# Patient Record
Sex: FEMALE | Race: OTHER | NOT HISPANIC OR LATINO
[De-identification: names, ages, dates, MRNs, and addresses within clinical notes are randomized per-mention and may not be internally consistent; named-entity substitution may affect disease eponyms.]

---

## 2017-04-03 ENCOUNTER — APPOINTMENT (OUTPATIENT)
Dept: PLASTIC SURGERY | Facility: CLINIC | Age: 32
End: 2017-04-03

## 2017-04-05 ENCOUNTER — APPOINTMENT (OUTPATIENT)
Dept: PLASTIC SURGERY | Facility: CLINIC | Age: 32
End: 2017-04-05

## 2017-04-12 ENCOUNTER — APPOINTMENT (OUTPATIENT)
Dept: PLASTIC SURGERY | Facility: CLINIC | Age: 32
End: 2017-04-12

## 2017-04-12 VITALS — BODY MASS INDEX: 30.36 KG/M2 | WEIGHT: 165 LBS | HEIGHT: 62 IN

## 2017-04-12 DIAGNOSIS — N62 HYPERTROPHY OF BREAST: ICD-10-CM

## 2017-06-15 ENCOUNTER — RESULT REVIEW (OUTPATIENT)
Age: 32
End: 2017-06-15

## 2018-08-03 ENCOUNTER — OFFICE VISIT (OUTPATIENT)
Dept: INTERNAL MEDICINE CLINIC | Facility: CLINIC | Age: 33
End: 2018-08-03
Payer: COMMERCIAL

## 2018-08-03 VITALS
SYSTOLIC BLOOD PRESSURE: 120 MMHG | DIASTOLIC BLOOD PRESSURE: 80 MMHG | BODY MASS INDEX: 30.56 KG/M2 | TEMPERATURE: 98 F | WEIGHT: 179 LBS | HEIGHT: 64 IN | HEART RATE: 71 BPM

## 2018-08-03 DIAGNOSIS — G89.29 CHRONIC BILATERAL LOW BACK PAIN WITHOUT SCIATICA: ICD-10-CM

## 2018-08-03 DIAGNOSIS — M26.629 TMJ SYNDROME: ICD-10-CM

## 2018-08-03 DIAGNOSIS — Z00.00 ANNUAL PHYSICAL EXAM: Primary | ICD-10-CM

## 2018-08-03 DIAGNOSIS — M25.50 ARTHRALGIA, UNSPECIFIED JOINT: ICD-10-CM

## 2018-08-03 DIAGNOSIS — M54.50 CHRONIC BILATERAL LOW BACK PAIN WITHOUT SCIATICA: ICD-10-CM

## 2018-08-03 PROCEDURE — 99395 PREV VISIT EST AGE 18-39: CPT | Performed by: INTERNAL MEDICINE

## 2018-08-03 NOTE — PROGRESS NOTES
HPI:    Patient ID: Indio Augustine is a 28year old female. Patient presents today for her annual physical       Arthralgia   This is a chronic problem. The current episode started more than 1 month ago. The problem occurs intermittently.  The proble exhibits no discharge. No scleral icterus. Neck: Normal range of motion. Neck supple. No thyromegaly present. Cardiovascular: Normal rate, regular rhythm and intact distal pulses. Exam reveals no gallop and no friction rub. No murmur heard.   Pulmon requested or ordered in this encounter    Imaging & Referrals:  None       [id number]

## 2018-08-04 PROBLEM — G89.29 CHRONIC BILATERAL LOW BACK PAIN WITHOUT SCIATICA: Status: ACTIVE | Noted: 2018-08-04

## 2018-08-04 PROBLEM — M26.629 TMJ SYNDROME: Status: ACTIVE | Noted: 2018-08-04

## 2018-08-04 PROBLEM — M25.50 ARTHRALGIA: Status: ACTIVE | Noted: 2018-08-04

## 2018-08-04 PROBLEM — Z00.00 ANNUAL PHYSICAL EXAM: Status: ACTIVE | Noted: 2018-08-04

## 2018-08-04 PROBLEM — M54.50 CHRONIC BILATERAL LOW BACK PAIN WITHOUT SCIATICA: Status: ACTIVE | Noted: 2018-08-04

## 2019-01-24 ENCOUNTER — APPOINTMENT (OUTPATIENT)
Dept: HUMAN REPRODUCTION | Facility: CLINIC | Age: 34
End: 2019-01-24
Payer: COMMERCIAL

## 2019-01-24 PROCEDURE — 99204 OFFICE O/P NEW MOD 45 MIN: CPT

## 2019-01-24 PROCEDURE — 76830 TRANSVAGINAL US NON-OB: CPT

## 2019-01-24 PROCEDURE — 36415 COLL VENOUS BLD VENIPUNCTURE: CPT

## 2019-01-24 RX ORDER — DOXYCYCLINE HYCLATE 100 MG/1
100 CAPSULE ORAL
Qty: 5 | Refills: 0 | Status: ACTIVE | COMMUNITY
Start: 2019-01-24 | End: 1900-01-01

## 2019-01-29 ENCOUNTER — OTHER (OUTPATIENT)
Age: 34
End: 2019-01-29

## 2019-01-29 DIAGNOSIS — N97.9 FEMALE INFERTILITY, UNSPECIFIED: ICD-10-CM

## 2019-01-30 RX ORDER — VITAMIN A ACETATE, ASCORBIC ACID, CHOLECALCIFEROL, ALPHA-TOCOPHEROL ACETATE, DL, THIAMINE MONONITRATE, RIBOFLAVIN, NIACINAMIDE, PYRIDOXINE HYDROCHLORIDE, FOLIC ACID, CYANOCOBALAMIN, BIOTIN, CALCIUM PANTOTHENATE, CALCIUM CARBONATE, FERROUS FUMARATE, POTASSIUM IODIDE, MAGNESIUM OXIDE, ZINC OXIDE AND CUPRIC OXIDE 2500; 80; 400; 10; 3; 3.4; 20; 20; 1; 12; 300; 6; 120; 27; 150; 30; 15; 2 [IU]/1; MG/1; [IU]/1; [IU]/1; MG/1; MG/1; MG/1; MG/1; MG/1; UG/1; UG/1; MG/1; MG/1; MG/1; UG/1; UG/1; UG/1; MG/1
27-0.6-0.4 TABLET, FILM COATED ORAL DAILY
Qty: 30 | Refills: 6 | Status: ACTIVE | COMMUNITY
Start: 2019-01-29 | End: 1900-01-01

## 2019-01-31 ENCOUNTER — APPOINTMENT (OUTPATIENT)
Dept: HUMAN REPRODUCTION | Facility: CLINIC | Age: 34
End: 2019-01-31
Payer: COMMERCIAL

## 2019-01-31 PROCEDURE — 36415 COLL VENOUS BLD VENIPUNCTURE: CPT

## 2019-02-04 ENCOUNTER — APPOINTMENT (OUTPATIENT)
Dept: RADIOLOGY | Facility: HOSPITAL | Age: 34
End: 2019-02-04
Payer: COMMERCIAL

## 2019-02-04 ENCOUNTER — OUTPATIENT (OUTPATIENT)
Dept: OUTPATIENT SERVICES | Facility: HOSPITAL | Age: 34
LOS: 1 days | End: 2019-02-04
Payer: MEDICAID

## 2019-02-04 DIAGNOSIS — N97.1 FEMALE INFERTILITY OF TUBAL ORIGIN: ICD-10-CM

## 2019-02-04 DIAGNOSIS — Z00.00 ENCOUNTER FOR GENERAL ADULT MEDICAL EXAMINATION WITHOUT ABNORMAL FINDINGS: ICD-10-CM

## 2019-02-04 PROCEDURE — 84702 CHORIONIC GONADOTROPIN TEST: CPT

## 2019-02-04 PROCEDURE — 58340 CATHETER FOR HYSTEROGRAPHY: CPT

## 2019-02-04 PROCEDURE — 74740 X-RAY FEMALE GENITAL TRACT: CPT

## 2019-02-04 PROCEDURE — 74740 X-RAY FEMALE GENITAL TRACT: CPT | Mod: 26

## 2019-02-07 ENCOUNTER — APPOINTMENT (OUTPATIENT)
Dept: HUMAN REPRODUCTION | Facility: CLINIC | Age: 34
End: 2019-02-07

## 2019-02-12 RX ORDER — NORETHINDRONE AND ETHINYL ESTRADIOL 1 MG-35MCG
1-35 KIT ORAL
Qty: 1 | Refills: 0 | Status: ACTIVE | COMMUNITY
Start: 2019-02-12 | End: 1900-01-01

## 2019-02-14 RX ORDER — VITAMIN C, CALCIUM, IRON, VITAMIN D3, VITAMIN E, THIAMIN, RIBOFLAVIN, NIACINAMIDE, VITAMIN B6, FOLIC ACID, IODINE, ZINC, COPPER, DOCUSATE SODIUM 120; 85; 30; 3; 20; 20; 1; 25; 2; 50; 159; 4.54; 150; 5; 400; 3.4 MG/1; MG/1; [IU]/1; MG/1; MG/1; MG/1; MG/1; MG/1; MG/1; MG/1; MG/1; MG/1; UG/1; MG/1; [IU]/1; MG/1
90-1 & 300 TABLET ORAL
Qty: 60 | Refills: 4 | Status: ACTIVE | COMMUNITY
Start: 2019-02-14 | End: 1900-01-01

## 2019-02-27 ENCOUNTER — APPOINTMENT (OUTPATIENT)
Dept: HUMAN REPRODUCTION | Facility: CLINIC | Age: 34
End: 2019-02-27
Payer: COMMERCIAL

## 2019-02-27 PROCEDURE — 99214 OFFICE O/P EST MOD 30 MIN: CPT

## 2020-04-09 ENCOUNTER — APPOINTMENT (OUTPATIENT)
Dept: DISASTER EMERGENCY | Facility: CLINIC | Age: 35
End: 2020-04-09

## 2021-10-01 ENCOUNTER — OFFICE VISIT (OUTPATIENT)
Dept: FAMILY MEDICINE CLINIC | Facility: CLINIC | Age: 36
End: 2021-10-01

## 2021-10-01 VITALS
BODY MASS INDEX: 33.63 KG/M2 | DIASTOLIC BLOOD PRESSURE: 76 MMHG | HEIGHT: 64 IN | WEIGHT: 197 LBS | TEMPERATURE: 99 F | SYSTOLIC BLOOD PRESSURE: 117 MMHG | HEART RATE: 76 BPM

## 2021-10-01 DIAGNOSIS — M26.609 TMJ DYSFUNCTION: Primary | ICD-10-CM

## 2021-10-01 PROBLEM — M25.50 ARTHRALGIA: Status: RESOLVED | Noted: 2018-08-04 | Resolved: 2021-10-01

## 2021-10-01 PROBLEM — N94.6 DYSMENORRHEA: Status: ACTIVE | Noted: 2017-04-17

## 2021-10-01 PROBLEM — Z00.00 ANNUAL PHYSICAL EXAM: Status: RESOLVED | Noted: 2018-08-04 | Resolved: 2021-10-01

## 2021-10-01 PROBLEM — N92.0 MENORRHAGIA: Status: ACTIVE | Noted: 2017-04-17

## 2021-10-01 PROCEDURE — 99214 OFFICE O/P EST MOD 30 MIN: CPT | Performed by: FAMILY MEDICINE

## 2021-10-01 PROCEDURE — 3078F DIAST BP <80 MM HG: CPT | Performed by: FAMILY MEDICINE

## 2021-10-01 PROCEDURE — 3008F BODY MASS INDEX DOCD: CPT | Performed by: FAMILY MEDICINE

## 2021-10-01 PROCEDURE — 3074F SYST BP LT 130 MM HG: CPT | Performed by: FAMILY MEDICINE

## 2021-10-01 RX ORDER — CYCLOBENZAPRINE HCL 10 MG
10 TABLET ORAL 2 TIMES DAILY PRN
Qty: 30 TABLET | Refills: 1 | Status: SHIPPED | OUTPATIENT
Start: 2021-10-01 | End: 2021-12-03

## 2021-10-01 NOTE — PROGRESS NOTES
HPI:   Landy Feng is a 39year old female who presents for an establish care visit. Patient has been experiencing right preauricular/TMJ related pain for the past 5 to 6 years, with worsening.     Has tried mouth guard with sleeping and seems to i states all of the above; about 5 drinks per week. Drug use: Yes      Types: Cannabis      Comment: ediblesvery few months. REVIEW OF SYSTEMS:   Negative, except per HPI.      EXAM:   /76 (BP Location: Left arm, Patient Position: Sitting,

## 2021-10-05 ENCOUNTER — OFFICE VISIT (OUTPATIENT)
Dept: OTOLARYNGOLOGY | Facility: CLINIC | Age: 36
End: 2021-10-05

## 2021-10-05 VITALS — HEIGHT: 64 IN | BODY MASS INDEX: 32.44 KG/M2 | WEIGHT: 190 LBS

## 2021-10-05 DIAGNOSIS — J34.89 NASAL CRUSTING: ICD-10-CM

## 2021-10-05 DIAGNOSIS — D10.39 PALATAL PAPILLOMA: ICD-10-CM

## 2021-10-05 DIAGNOSIS — M26.621 ARTHRALGIA OF RIGHT TEMPOROMANDIBULAR JOINT: Primary | ICD-10-CM

## 2021-10-05 PROCEDURE — 3008F BODY MASS INDEX DOCD: CPT | Performed by: SPECIALIST

## 2021-10-05 PROCEDURE — 99203 OFFICE O/P NEW LOW 30 MIN: CPT | Performed by: SPECIALIST

## 2021-10-05 RX ORDER — SULFAMETHOXAZOLE AND TRIMETHOPRIM 800; 160 MG/1; MG/1
1 TABLET ORAL EVERY 12 HOURS
Qty: 20 TABLET | Refills: 0 | Status: SHIPPED | OUTPATIENT
Start: 2021-10-05 | End: 2021-10-15

## 2021-10-05 NOTE — PROGRESS NOTES
Indio Augustine is a 39year old female. Patient presents with:  Jaw Pain: pt presents today for jaw pain for the past 5 years, pt states hears clickling when opening and closing jaw.      HPI:   Significant jaw clicking    Current Outpatient Medications Constitutional Overall appearance - Normal.   Head/Face Facial features - Normal. Eyebrows - Normal. Skull - Normal.   Eyes Conjunctiva - Right: Normal, Left: Normal. Pupil - Right: Normal, Left: Normal.    Ears Inspection - Right: Normal, Left: Normal.

## 2021-10-05 NOTE — PATIENT INSTRUCTIONS
You have a TMJ arthralgia worse on the right with clicking. Apply heat, eat a soft diet, take Aleve in the day and Flexeril at night, no gum chewing, and consider getting a dental splint. There is a small left palatal papilloma which looks benign.     Deborah Merino

## 2021-11-16 ENCOUNTER — OFFICE VISIT (OUTPATIENT)
Dept: OTOLARYNGOLOGY | Facility: CLINIC | Age: 36
End: 2021-11-16

## 2021-11-16 VITALS — WEIGHT: 190 LBS | HEIGHT: 64 IN | BODY MASS INDEX: 32.44 KG/M2

## 2021-11-16 DIAGNOSIS — M26.621 ARTHRALGIA OF RIGHT TEMPOROMANDIBULAR JOINT: ICD-10-CM

## 2021-11-16 DIAGNOSIS — S03.00XS TMJ (DISLOCATION OF TEMPOROMANDIBULAR JOINT), SEQUELA: Primary | ICD-10-CM

## 2021-11-16 DIAGNOSIS — J34.89 NASAL CRUSTING: ICD-10-CM

## 2021-11-16 PROCEDURE — 3008F BODY MASS INDEX DOCD: CPT | Performed by: SPECIALIST

## 2021-11-16 PROCEDURE — 99213 OFFICE O/P EST LOW 20 MIN: CPT | Performed by: SPECIALIST

## 2021-11-16 RX ORDER — SULFAMETHOXAZOLE AND TRIMETHOPRIM 800; 160 MG/1; MG/1
1 TABLET ORAL EVERY 12 HOURS
Qty: 14 TABLET | Refills: 0 | Status: SHIPPED | OUTPATIENT
Start: 2021-11-16 | End: 2021-11-26

## 2021-11-16 NOTE — PROGRESS NOTES
Alwin Closs is a 39year old female. Patient presents with: Follow - Up: pt presents today for f/u TMJ . pt states stillhas the same sxs. HPI:   Persistent TMJ symptoms despite conservative treatment.     Current Outpatient Medications   Medicat Constitutional Overall appearance - Normal.   Head/Face Facial features - Normal. Eyebrows - Normal. Skull - Normal.   Eyes Conjunctiva - Right: Normal, Left: Normal. Pupil - Right: Normal, Left: Normal.    Ears Inspection - Right: Normal, Left: Normal.

## 2021-11-16 NOTE — PATIENT INSTRUCTIONS
Clicking of your left temporomandibular joint. It appears that there is some dislocation occurring with movement. An MRI of the temporomandibular joint was ordered.

## 2021-12-02 ENCOUNTER — HOSPITAL ENCOUNTER (OUTPATIENT)
Dept: MRI IMAGING | Facility: HOSPITAL | Age: 36
Discharge: HOME | End: 2021-12-02
Attending: SPECIALIST
Payer: COMMERCIAL

## 2021-12-02 DIAGNOSIS — S03.00XS TMJ (DISLOCATION OF TEMPOROMANDIBULAR JOINT), SEQUELA: ICD-10-CM

## 2021-12-02 PROCEDURE — 70336 MAGNETIC IMAGE JAW JOINT: CPT | Performed by: SPECIALIST

## 2021-12-03 ENCOUNTER — TELEPHONE (OUTPATIENT)
Dept: FAMILY MEDICINE CLINIC | Facility: CLINIC | Age: 36
End: 2021-12-03

## 2021-12-03 DIAGNOSIS — M26.609 TMJ DYSFUNCTION: ICD-10-CM

## 2021-12-03 NOTE — PROGRESS NOTES
The only oral surgeon that I know who does the replacement is Colleen Schuster, however, would try someone in house first.  I am not familiar enough with any of them to know if any specialize in this area.

## 2021-12-03 NOTE — TELEPHONE ENCOUNTER
Please help patient schedule a video visit for follow-up and to discuss physical therapy for TMJ and oral surgeon (per ENT recommendation).

## 2021-12-03 NOTE — PROGRESS NOTES
Consider oral surgery consultation. May need surgery, however not all oral surgeons will do TMJ joint replacement. Can consider TMJ physical therapy.

## 2021-12-06 RX ORDER — CYCLOBENZAPRINE HCL 10 MG
10 TABLET ORAL 2 TIMES DAILY PRN
Qty: 30 TABLET | Refills: 0 | Status: SHIPPED | OUTPATIENT
Start: 2021-12-06 | End: 2022-05-21

## 2021-12-09 ENCOUNTER — TELEMEDICINE (OUTPATIENT)
Dept: FAMILY MEDICINE CLINIC | Facility: CLINIC | Age: 36
End: 2021-12-09

## 2021-12-09 DIAGNOSIS — M26.609 TMJ DYSFUNCTION: Primary | ICD-10-CM

## 2021-12-09 DIAGNOSIS — M25.50 ARTHRALGIA, UNSPECIFIED JOINT: ICD-10-CM

## 2021-12-09 DIAGNOSIS — Z82.61 FAMILY HISTORY OF RHEUMATOID ARTHRITIS: ICD-10-CM

## 2021-12-09 PROCEDURE — 99214 OFFICE O/P EST MOD 30 MIN: CPT | Performed by: FAMILY MEDICINE

## 2021-12-09 NOTE — PROGRESS NOTES
Virtual Telephone Check-In    Darwin Tomlin verbally consents to a Virtual/Telephone Check-In service on 12/09/21.     Patient understands and accepts financial responsibility for any deductible, co-insurance and/or co-pays associated with this servi

## 2022-03-09 ENCOUNTER — TELEPHONE (OUTPATIENT)
Dept: PLASTIC SURGERY | Facility: CLINIC | Age: 37
End: 2022-03-09

## 2022-03-09 NOTE — TELEPHONE ENCOUNTER
Spoke with patient in regards to breast reduction consultation  This patient has had physical therapy in the past, however not over the last year  She has moved here from [place] about a year ago  She understands that we need 3 months of PT and/or chiropractic as well as 3 months of conservative treatment in order to schedule a consult  She will begin physical therapy again and has my name and direct line to call me once she has completed that  She will also talk with her PCP in [place] about getting documentation in regards to treatment  Everett Zuleta verbalized understanding and will call once she is ready to schedule

## 2022-03-28 ENCOUNTER — OFFICE VISIT (OUTPATIENT)
Dept: OBGYN CLINIC | Facility: CLINIC | Age: 37
End: 2022-03-28
Payer: COMMERCIAL

## 2022-03-28 VITALS
DIASTOLIC BLOOD PRESSURE: 78 MMHG | HEIGHT: 62 IN | BODY MASS INDEX: 34.04 KG/M2 | WEIGHT: 185 LBS | HEART RATE: 94 BPM | SYSTOLIC BLOOD PRESSURE: 117 MMHG

## 2022-03-28 DIAGNOSIS — M94.0 COSTOCHONDRITIS: Primary | ICD-10-CM

## 2022-03-28 DIAGNOSIS — S29.011A STRAIN OF LEFT PECTORALIS MUSCLE, INITIAL ENCOUNTER: ICD-10-CM

## 2022-03-28 DIAGNOSIS — S46.212A BICEPS STRAIN, LEFT, INITIAL ENCOUNTER: ICD-10-CM

## 2022-03-28 PROCEDURE — 99203 OFFICE O/P NEW LOW 30 MIN: CPT | Performed by: FAMILY MEDICINE

## 2022-03-28 RX ORDER — MELOXICAM 7.5 MG/1
TABLET ORAL
COMMUNITY
Start: 2021-12-28

## 2022-03-28 RX ORDER — FLUTICASONE PROPIONATE 50 MCG
SPRAY, SUSPENSION (ML) NASAL
COMMUNITY
Start: 2022-03-01

## 2022-03-28 RX ORDER — NAPROXEN 500 MG/1
500 TABLET ORAL 2 TIMES DAILY WITH MEALS
Qty: 60 TABLET | Refills: 0 | Status: SHIPPED | OUTPATIENT
Start: 2022-03-28

## 2022-03-28 NOTE — PROGRESS NOTES
Assessment/Plan:  Assessment/Plan   Diagnoses and all orders for this visit:    Costochondritis  -     naproxen (Naprosyn) 500 mg tablet; Take 1 tablet (500 mg total) by mouth 2 (two) times a day with meals  -     Ambulatory Referral to Physical Therapy; Future    Strain of left pectoralis muscle, initial encounter  -     naproxen (Naprosyn) 500 mg tablet; Take 1 tablet (500 mg total) by mouth 2 (two) times a day with meals  -     Ambulatory Referral to Physical Therapy; Future    Biceps strain, left, initial encounter  -     naproxen (Naprosyn) 500 mg tablet; Take 1 tablet (500 mg total) by mouth 2 (two) times a day with meals  -     Ambulatory Referral to Physical Therapy; Future    Other orders  -     fluticasone (FLONASE) 50 mcg/act nasal spray; SPRAY 1 SPRAY INTO EACH NOSTRIL EVERY DAY FOR 30 DAYS  -     meloxicam (MOBIC) 7 5 mg tablet; TAKE 1 TABLET BY MOUTH DAILY WITH MEALS INDICATIONS: BILATEARAL KNEE OSTEOARTHRITIS        58-year-old right-hand-dominant female with left chest wall and left shoulder pain more than 2 months duration  Discussed with patient physical exam, impression and plan  Physical exam of cervical spine is unremarkable for midline or paraspinal tenderness  She has normal range of cervical spine  There is negative axial load and negative Spurling's  Left shoulder noted for mild tenderness at the anterior aspect  She has mild tenderness along the of the pectorals on the left and ribs 2 and 3 on the left  She also has tenderness at the costochondral junction of the 2nd and 3rd ribs  Left shoulder range of motion forward flexion to 170° and abduction to 160° with internal rotation lumbar spine  She has full strength in the shoulder  Empty can testing and Bessy July are unremarkable  She has normal sensation, biceps reflex, and radial pulse both upper extremities  Clinical impression is that she is symptomatic from combination of pectoralis strain and costochondritis    I discussed regimen of anti-inflammatory, supplements, topical anesthetic, and physical therapy  She is to take naproxen 500 mg twice daily with food for 2 weeks  She is to start taking tumeric at least 1000 mg daily, tart cherry at least 1000 mg daily, and glucosamine-chondroitin 2 to 3 times a day  She may apply topical diclofenac gel 3 to 4 times a day for the next 10 days and may also apply topical CBD  She is to start physical therapy as soon as possible and do home exercises directed  She will follow up if no improvement after completing formal therapy  Subjective:   Patient ID: Henny Hebert is a 39 y o  female  Chief Complaint   Patient presents with    Left Shoulder - Pain       27-year-old right-hand-dominant female presents for evaluation of left-sided chest wall left shoulder pain more than 2 months duration  She denies any particular trauma or inciting event  Pain described as gradual in onset localized to the left chest wall and radiating to left shoulder, achy and sore, worse with movement, associated stiffness and symptoms usually worse in the morning, and improved with changing position  She started self treating with stretching and NSAIDs  She does report that she has been carrying her 3year-old child in left arm  She saw primary care provider reports having x-rays of the chest wall and left shoulder done which were unremarkable  She was advised to continue with taking NSAIDs and follow up with orthopedic care  Shoulder Pain  This is a new problem  The current episode started more than 1 month ago  The problem occurs daily  The problem has been gradually worsening  Associated symptoms include arthralgias  Pertinent negatives include no abdominal pain, chest pain, chills, fever, joint swelling, numbness, rash, sore throat or weakness  Exacerbated by: Arm use  She has tried rest, position changes and NSAIDs for the symptoms  The treatment provided mild relief             The following portions of the patient's history were reviewed and updated as appropriate: She  has no past medical history on file  She  has a past surgical history that includes Knee arthroscopy (Right, 2019)  Her family history includes No Known Problems in her father and mother  She  reports that she has never smoked  She has never used smokeless tobacco  No history on file for alcohol use and drug use  She has No Known Allergies       Review of Systems   Constitutional: Negative for chills and fever  HENT: Negative for sore throat  Eyes: Negative for visual disturbance  Respiratory: Negative for shortness of breath  Cardiovascular: Negative for chest pain  Gastrointestinal: Negative for abdominal pain  Genitourinary: Negative for flank pain  Musculoskeletal: Positive for arthralgias  Negative for joint swelling  Skin: Negative for rash and wound  Neurological: Negative for weakness and numbness  Hematological: Does not bruise/bleed easily  Psychiatric/Behavioral: Negative for self-injury  Objective:  Vitals:    03/28/22 1307   BP: 117/78   Pulse: 94   Weight: 83 9 kg (185 lb)   Height: 5' 2" (1 575 m)     Back Exam     Comments:    Cervical spine  -no tenderness  -normal range of motion   -negative axial load  -negative Spurling's  -normal strength, sensation, and biceps reflex both upper extremities      Right Hand Exam     Muscle Strength   The patient has normal right wrist strength  Other   Sensation: normal  Pulse: present      Left Hand Exam     Muscle Strength   The patient has normal left wrist strength  Other   Sensation: normal  Pulse: present      Right Elbow Exam     Other   Sensation: normal      Left Elbow Exam     Tenderness   The patient is experiencing no tenderness  Range of Motion   The patient has normal left elbow ROM  Muscle Strength   The patient has normal left elbow strength (5/5 flexion and extension)      Other   Sensation: normal      Right Shoulder Exam Muscle Strength   Abduction: 5/5     Other   Sensation: normal      Left Shoulder Exam     Tenderness   Left shoulder tenderness location: Anterior  Range of Motion   Active abduction: 160   Forward flexion: 170   Internal rotation 0 degrees: Lumbar     Muscle Strength   The patient has normal left shoulder strength  Tests   Boggs test: negative    Other   Sensation: normal     Comments:  Negative empty can test  Negative belly press  Negative push-off          Strength/Myotome Testing     Left Wrist/Hand   Normal wrist strength    Right Wrist/Hand   Normal wrist strength      Physical Exam  Vitals and nursing note reviewed  Constitutional:       General: She is not in acute distress  Appearance: She is well-developed  She is not ill-appearing or diaphoretic  HENT:      Head: Normocephalic  Right Ear: External ear normal       Left Ear: External ear normal    Eyes:      Conjunctiva/sclera: Conjunctivae normal    Neck:      Trachea: No tracheal deviation  Cardiovascular:      Rate and Rhythm: Normal rate  Pulmonary:      Effort: Pulmonary effort is normal  No respiratory distress  Abdominal:      General: There is no distension  Musculoskeletal:         General: Tenderness (Left upper pectoralis and ribs 2 and 3 costochondral junction) present  No swelling, deformity or signs of injury  Skin:     General: Skin is warm and dry  Coloration: Skin is not jaundiced or pale  Neurological:      Mental Status: She is alert and oriented to person, place, and time  Psychiatric:         Mood and Affect: Mood normal          Behavior: Behavior normal          Thought Content:  Thought content normal          Judgment: Judgment normal

## 2022-05-21 DIAGNOSIS — M26.609 TMJ DYSFUNCTION: ICD-10-CM

## 2022-05-24 RX ORDER — CYCLOBENZAPRINE HCL 10 MG
10 TABLET ORAL 2 TIMES DAILY PRN
Qty: 30 TABLET | Refills: 0 | Status: SHIPPED | OUTPATIENT
Start: 2022-05-24

## 2022-05-24 NOTE — TELEPHONE ENCOUNTER
LOV 11/16/21    This refill request is being sent to the provider for the following reason:  [x]Medication is not within protocol

## 2022-07-01 ENCOUNTER — OFFICE VISIT (OUTPATIENT)
Dept: OBGYN CLINIC | Facility: CLINIC | Age: 37
End: 2022-07-01
Payer: COMMERCIAL

## 2022-07-01 VITALS — WEIGHT: 188 LBS | BODY MASS INDEX: 32 KG/M2 | SYSTOLIC BLOOD PRESSURE: 124 MMHG | DIASTOLIC BLOOD PRESSURE: 84 MMHG

## 2022-07-01 DIAGNOSIS — N89.8 VAGINAL ODOR: ICD-10-CM

## 2022-07-01 DIAGNOSIS — N92.0 MENORRHAGIA WITH REGULAR CYCLE: ICD-10-CM

## 2022-07-01 DIAGNOSIS — Z11.3 SCREENING EXAMINATION FOR SEXUALLY TRANSMITTED DISEASE: ICD-10-CM

## 2022-07-01 DIAGNOSIS — Z12.4 PAPANICOLAOU SMEAR FOR CERVICAL CANCER SCREENING: ICD-10-CM

## 2022-07-01 DIAGNOSIS — N63.23 BREAST LUMP ON LEFT SIDE AT 4 O'CLOCK POSITION: ICD-10-CM

## 2022-07-01 DIAGNOSIS — Z01.419 ENCOUNTER FOR WELL WOMAN EXAM WITH ROUTINE GYNECOLOGICAL EXAM: Primary | ICD-10-CM

## 2022-07-01 PROCEDURE — 87808 TRICHOMONAS ASSAY W/OPTIC: CPT | Performed by: NURSE PRACTITIONER

## 2022-07-01 PROCEDURE — 87491 CHLMYD TRACH DNA AMP PROBE: CPT | Performed by: NURSE PRACTITIONER

## 2022-07-01 PROCEDURE — 87106 FUNGI IDENTIFICATION YEAST: CPT | Performed by: NURSE PRACTITIONER

## 2022-07-01 PROCEDURE — 87591 N.GONORRHOEAE DNA AMP PROB: CPT | Performed by: NURSE PRACTITIONER

## 2022-07-01 PROCEDURE — 87624 HPV HI-RISK TYP POOLED RSLT: CPT | Performed by: NURSE PRACTITIONER

## 2022-07-01 PROCEDURE — 87205 SMEAR GRAM STAIN: CPT | Performed by: NURSE PRACTITIONER

## 2022-07-03 LAB
GENITAL VAGINOSIS SCREEN: NEGATIVE
TRICHOMONAS SCREEN: NEGATIVE

## 2022-07-05 LAB
C TRACH DNA SPEC QL NAA+PROBE: NEGATIVE
HPV I/H RISK 1 DNA SPEC QL NAA+PROBE: NEGATIVE
N GONORRHOEA DNA SPEC QL NAA+PROBE: NEGATIVE

## 2022-08-06 ENCOUNTER — PATIENT MESSAGE (OUTPATIENT)
Dept: FAMILY MEDICINE CLINIC | Facility: CLINIC | Age: 37
End: 2022-08-06

## 2022-08-06 ENCOUNTER — NURSE TRIAGE (OUTPATIENT)
Dept: FAMILY MEDICINE CLINIC | Facility: CLINIC | Age: 37
End: 2022-08-06

## 2022-08-07 NOTE — TELEPHONE ENCOUNTER
See acute encounter 8/6/22. From: Sunday Caterina  To: Tyrese Leger MD  Sent: 8/6/2022  2:48 PM CDT  Subject: Mental Health-Counseling Referral    Randall Heredia,  Would you be able to refer me to an in-network theapist? Preferably in [address] [place] [address] or [place]. I would like to talk to someone about working through some issues.  Thank you in advance,  Eloy Murray

## 2022-08-07 NOTE — TELEPHONE ENCOUNTER
Bango message sent. Not on medication, no medical dx . From: Radha Franklin  To: Bret Decker MD  Sent: 8/6/2022  2:48 PM CDT  Subject: Mental Health-Counseling Referral    Randall Heredia,  Would you be able to refer me to an in-network theapist? Preferably in [address] [place] [address] or [place]. I would like to talk to someone about working through some issues.  Thank you in advance,  Minus Flax

## 2022-08-16 ENCOUNTER — OFFICE VISIT (OUTPATIENT)
Dept: OBGYN CLINIC | Facility: CLINIC | Age: 37
End: 2022-08-16
Payer: COMMERCIAL

## 2022-08-16 VITALS
WEIGHT: 175 LBS | DIASTOLIC BLOOD PRESSURE: 81 MMHG | SYSTOLIC BLOOD PRESSURE: 117 MMHG | BODY MASS INDEX: 32.2 KG/M2 | HEART RATE: 93 BPM | HEIGHT: 62 IN

## 2022-08-16 DIAGNOSIS — R07.81 RIB PAIN ON LEFT SIDE: Primary | ICD-10-CM

## 2022-08-16 PROCEDURE — 99213 OFFICE O/P EST LOW 20 MIN: CPT | Performed by: FAMILY MEDICINE

## 2022-08-16 NOTE — PROGRESS NOTES
Assessment/Plan:  Assessment/Plan   Diagnoses and all orders for this visit:    Rib pain on left side  -     MRI chest wall wo contrast; Future        68-year-old right-hand-dominant female with left chest wall left shoulder pain more than 6 months duration  Discussed with patient physical exam, imaging studies, impression and plan  X-rays of left chest wall done at outside facility are unremarkable for osseous abnormality  Physical exam noted for tenderness left anterior chest wall  Left shoulder has range of motion forward flexion to 170°, abduction 150°, and internal to lumbar spine  She has normal strength in the shoulder  There is pain ball with resisted shoulder internal rotation assisted arm adduction  Clinical impression is that she is symptomatic from pathology of left chest wall  She is still having symptoms despite conservative management of naproxen 500 mg twice daily since 03/28/2022, topical diclofenac and topical CBD daily for more than 3 months, and formal therapy and home exercises since 04/06/2022  At this time I will refer for MRI of the left chest wall to evaluate for occult cartilage and bone irregularity as invasive management may be warranted  She will follow up me after getting MRI done  Subjective:   Patient ID: Natalie Piedra is a 44 y o  female  Chief Complaint   Patient presents with    Chest - Pain, Follow-up       68-year-old right-hand-dominant female following for left chest wall and left shoulder pain more than 6 months duration  She was last seen by me more than 4 5 months ago at which point she was referred to formal therapy  She was advised on topical diclofenac and topical CBD  She has been attending formal therapy and doing home exercises since 04/06/2022  She has also been applying topical diclofenac and topical CBD daily for more than 3 months    She reports improvement in left shoulder pain however left chest wall pain persist   Pain described as localized to the left anterior chest wall, nonradiating, worse with direct pressure and laying on her left side, and improved with changing position  She has difficulty with sleep due to symptoms  Review of Systems   Musculoskeletal: Negative for arthralgias and joint swelling  Left chest wall   Neurological: Negative for weakness and numbness  Objective:  Vitals:    08/16/22 1341   BP: 117/81   Pulse: 93   Weight: 79 4 kg (175 lb)   Height: 5' 2" (1 575 m)     Left Elbow Exam     Tenderness   The patient is experiencing no tenderness  Range of Motion   The patient has normal left elbow ROM  Muscle Strength   The patient has normal left elbow strength (5/5 flexion and extension)  Left Shoulder Exam     Tenderness   The patient is experiencing no tenderness  Range of Motion   Active abduction: 150   Forward flexion: 170   Internal rotation 0 degrees: Lumbar     Muscle Strength   The patient has normal left shoulder strength  Physical Exam  Vitals and nursing note reviewed  Constitutional:       General: She is not in acute distress  Appearance: She is well-developed  She is not ill-appearing or diaphoretic  HENT:      Head: Normocephalic  Right Ear: External ear normal       Left Ear: External ear normal    Eyes:      Conjunctiva/sclera: Conjunctivae normal    Neck:      Trachea: No tracheal deviation  Cardiovascular:      Rate and Rhythm: Normal rate  Pulmonary:      Effort: Pulmonary effort is normal  No respiratory distress  Abdominal:      General: There is no distension  Musculoskeletal:         General: Tenderness (Left anterior chest wall) present  No swelling, deformity or signs of injury  Skin:     General: Skin is warm and dry  Coloration: Skin is not jaundiced or pale  Neurological:      Mental Status: She is alert and oriented to person, place, and time     Psychiatric:         Mood and Affect: Mood normal  Behavior: Behavior normal          Thought Content:  Thought content normal          Judgment: Judgment normal

## 2022-08-18 ENCOUNTER — TELEPHONE (OUTPATIENT)
Dept: PLASTIC SURGERY | Facility: CLINIC | Age: 37
End: 2022-08-18

## 2022-08-30 ENCOUNTER — TELEPHONE (OUTPATIENT)
Dept: PLASTIC SURGERY | Facility: CLINIC | Age: 37
End: 2022-08-30

## 2022-08-30 NOTE — TELEPHONE ENCOUNTER
CALLED PATIENT TO REVIEW CRITERIA FOR BREAST REDUCTION  Patient will let me know when documentation is sent and information from insurance company to verify we are par and in network

## 2022-09-09 ENCOUNTER — TELEPHONE (OUTPATIENT)
Dept: OBGYN CLINIC | Facility: CLINIC | Age: 37
End: 2022-09-09

## 2022-09-12 ENCOUNTER — TELEPHONE (OUTPATIENT)
Dept: OBGYN CLINIC | Facility: HOSPITAL | Age: 37
End: 2022-09-12

## 2022-09-12 ENCOUNTER — TELEPHONE (OUTPATIENT)
Dept: PLASTIC SURGERY | Facility: CLINIC | Age: 37
End: 2022-09-12

## 2022-09-12 NOTE — TELEPHONE ENCOUNTER
Left another message for her to please return my call to disc  Her MRI  I can schedule her this afternoon if she is available to come in and the appt  Is still available  Waiting for a call back

## 2022-09-13 ENCOUNTER — OFFICE VISIT (OUTPATIENT)
Dept: OBGYN CLINIC | Facility: CLINIC | Age: 37
End: 2022-09-13
Payer: COMMERCIAL

## 2022-09-13 VITALS
BODY MASS INDEX: 32.2 KG/M2 | WEIGHT: 175 LBS | HEIGHT: 62 IN | HEART RATE: 108 BPM | SYSTOLIC BLOOD PRESSURE: 115 MMHG | DIASTOLIC BLOOD PRESSURE: 82 MMHG

## 2022-09-13 DIAGNOSIS — R07.81 RIB PAIN ON LEFT SIDE: ICD-10-CM

## 2022-09-13 DIAGNOSIS — M94.0 COSTOCHONDRITIS: Primary | ICD-10-CM

## 2022-09-13 PROCEDURE — 99213 OFFICE O/P EST LOW 20 MIN: CPT | Performed by: FAMILY MEDICINE

## 2022-09-13 RX ORDER — IBUPROFEN 800 MG/1
800 TABLET ORAL 2 TIMES DAILY PRN
Qty: 60 TABLET | Refills: 0 | Status: SHIPPED | OUTPATIENT
Start: 2022-09-13

## 2022-09-13 NOTE — PROGRESS NOTES
Assessment/Plan:  Assessment/Plan   Diagnoses and all orders for this visit:    Costochondritis  -     Ambulatory Referral to Pain Management; Future  -     ibuprofen (MOTRIN) 800 mg tablet; Take 1 tablet (800 mg total) by mouth 2 (two) times a day as needed for moderate pain    Rib pain on left side  -     Ambulatory Referral to Pain Management; Future  -     ibuprofen (MOTRIN) 800 mg tablet; Take 1 tablet (800 mg total) by mouth 2 (two) times a day as needed for moderate pain        44year-old right-hand-dominant female with left chest wall left shoulder pain more than 7 months duration  Discussed with patient MRI results, impression and plan  MRI left chest wall is unremarkable  Clinical impression is that she is symptomatic from costochondritis  She has not improved despite conservative management of NSAIDs and formal therapy  At this time I will refer to pain management for further evaluation and recommendation of treatment  Patient was advised to obtain disc of MRI images for my review to confirm no osseous or soft tissue abnormality  She will follow up with me after seeing pain management  Subjective:   Patient ID: Tsering Israel is a 44 y o  female  Chief Complaint   Patient presents with    Chest - Pain       77-year-old right-hand-dominant female following up for left chest wall pain more than 7 months duration  She was last seen by me 3 weeks ago which point she was referred for MRI of the left chest wall  She denies any changes in symptoms since last visit  She has been experiencing pain described as localized to the left anterior chest wall, nonradiating, worse with direct pressure and laying on her left side, and improved with changing position  She has been managing symptoms with taking ibuprofen and with THC/CBD  Symptoms have been bothersome at nighttime and affect her sleep              Review of Systems    Objective:  Vitals:    09/13/22 0936   BP: 115/82   Pulse: (!) 108 Weight: 79 4 kg (175 lb)   Height: 5' 2" (1 575 m)     Ortho Exam    Physical Exam  Vitals and nursing note reviewed  Constitutional:       General: She is not in acute distress  Appearance: She is well-developed  She is not ill-appearing or diaphoretic  HENT:      Head: Normocephalic  Right Ear: External ear normal       Left Ear: External ear normal    Eyes:      Conjunctiva/sclera: Conjunctivae normal    Neck:      Trachea: No tracheal deviation  Cardiovascular:      Comments: Tachycardic  Pulmonary:      Effort: Pulmonary effort is normal  No respiratory distress  Abdominal:      General: There is no distension  Skin:     General: Skin is warm and dry  Coloration: Skin is not jaundiced or pale  Neurological:      Mental Status: She is alert and oriented to person, place, and time  Psychiatric:         Mood and Affect: Mood normal          Behavior: Behavior normal          Thought Content:  Thought content normal          Judgment: Judgment normal

## 2022-10-14 ENCOUNTER — CONSULT (OUTPATIENT)
Dept: GASTROENTEROLOGY | Facility: CLINIC | Age: 37
End: 2022-10-14
Payer: COMMERCIAL

## 2022-10-14 VITALS
HEIGHT: 62 IN | TEMPERATURE: 98.2 F | OXYGEN SATURATION: 97 % | WEIGHT: 182 LBS | HEART RATE: 87 BPM | DIASTOLIC BLOOD PRESSURE: 83 MMHG | SYSTOLIC BLOOD PRESSURE: 133 MMHG | BODY MASS INDEX: 33.49 KG/M2

## 2022-10-14 DIAGNOSIS — Z80.0 FAMILY HISTORY OF COLON CANCER: ICD-10-CM

## 2022-10-14 DIAGNOSIS — K62.5 RECTAL BLEEDING: Primary | ICD-10-CM

## 2022-10-14 PROCEDURE — 99244 OFF/OP CNSLTJ NEW/EST MOD 40: CPT | Performed by: INTERNAL MEDICINE

## 2022-10-14 RX ORDER — SODIUM PICOSULFATE, MAGNESIUM OXIDE, AND ANHYDROUS CITRIC ACID 10; 3.5; 12 MG/160ML; G/160ML; G/160ML
1 LIQUID ORAL SEE ADMIN INSTRUCTIONS
Qty: 320 ML | Refills: 0 | Status: SHIPPED | OUTPATIENT
Start: 2022-10-14

## 2022-10-14 NOTE — PROGRESS NOTES
Consultation - [facility]  Sarah Small 1985 female         Chief Complaint:  Rectal bleeding    HPI:  58-year-old female with no significant past medical history had few episodes of fresh bleeding from the rectum couple of days ago  She noticed the stool was brown colored but there was lot of fresh bleeding  Denies any bleeding since yesterday  She gives history of hemorrhoidectomy 5 years ago but developed some hemorrhoids again when she was pregnant 3 years ago  Reports having regular bowel movements and denies any straining  Good appetite, no recent weight loss  No abdominal pain, nausea or vomiting  Denies any heartburn acid reflux  Denies any difficulty swallowing  Her brother was recently diagnosed with colon cancer at age 55    Chaperon: Ms Bailey Ariza: Review of Systems   Constitutional: Negative for activity change, appetite change, chills, diaphoresis, fatigue, fever and unexpected weight change  HENT: Negative for ear discharge, ear pain, facial swelling, hearing loss, nosebleeds, sore throat, tinnitus and voice change  Eyes: Negative for pain, discharge, redness, itching and visual disturbance  Respiratory: Negative for apnea, cough, chest tightness, shortness of breath and wheezing  Cardiovascular: Positive for chest pain  Negative for palpitations  Gastrointestinal:        As noted in HPI   Endocrine: Negative for cold intolerance, heat intolerance and polyuria  Genitourinary: Negative for difficulty urinating, dysuria, flank pain, hematuria and urgency  Musculoskeletal: Positive for arthralgias  Negative for back pain, gait problem, joint swelling and myalgias  Skin: Negative for rash and wound  Neurological: Negative for dizziness, tremors, seizures, speech difficulty, light-headedness, numbness and headaches  Hematological: Negative for adenopathy  Does not bruise/bleed easily     Psychiatric/Behavioral: Negative for agitation, behavioral problems and confusion  The patient is not nervous/anxious  Past Medical History:   Diagnosis Date   • Hemorrhoid    • Herniation of cervical intervertebral disc       Past Surgical History:   Procedure Laterality Date   • KNEE ARTHROSCOPY Right 2019     Social History     Socioeconomic History   • Marital status: /Civil Union     Spouse name: Not on file   • Number of children: Not on file   • Years of education: Not on file   • Highest education level: Not on file   Occupational History   • Not on file   Tobacco Use   • Smoking status: Never Smoker   • Smokeless tobacco: Never Used   Vaping Use   • Vaping Use: Some days   • Substances: Nicotine   Substance and Sexual Activity   • Alcohol use: Yes     Comment: socially   • Drug use: Yes     Types: Marijuana     Comment: medical   • Sexual activity: Not on file   Other Topics Concern   • Not on file   Social History Narrative    Patient has central air condition but patient does not run that liberally  Bedroom windows are kept open at night in the warmer months  No smokers  No pets  No mold growth  Social Determinants of Health     Financial Resource Strain: Not on file   Food Insecurity: Not on file   Transportation Needs: Not on file   Physical Activity: Not on file   Stress: Not on file   Social Connections: Not on file   Intimate Partner Violence: Not on file   Housing Stability: Not on file     Family History   Problem Relation Age of Onset   • No Known Problems Mother    • No Known Problems Father    • Colon cancer Brother      Patient has no known allergies    Current Outpatient Medications   Medication Sig Dispense Refill   • fluticasone (FLONASE) 50 mcg/act nasal spray SPRAY 1 SPRAY INTO EACH NOSTRIL EVERY DAY FOR 30 DAYS     • ibuprofen (MOTRIN) 800 mg tablet Take 1 tablet (800 mg total) by mouth 2 (two) times a day as needed for moderate pain 60 tablet 0   • Sod Picosulfate-Mag Ox-Cit Acd (Clenpiq) 10-3 5-12 MG-GM -GM/160ML SOLN Take 1 Package by mouth see administration instructions 320 mL 0   • meloxicam (MOBIC) 7 5 mg tablet TAKE 1 TABLET BY MOUTH DAILY WITH MEALS INDICATIONS: BILATEARAL KNEE OSTEOARTHRITIS (Patient not taking: No sig reported)     • naproxen (Naprosyn) 500 mg tablet Take 1 tablet (500 mg total) by mouth 2 (two) times a day with meals (Patient not taking: No sig reported) 60 tablet 0     No current facility-administered medications for this visit  Blood pressure 133/83, pulse 87, temperature 98 2 °F (36 8 °C), height 5' 2" (1 575 m), weight 82 6 kg (182 lb), SpO2 97 %  PHYSICAL EXAM: Physical Exam  Constitutional:       Appearance: Normal appearance  She is well-developed  HENT:      Head: Normocephalic and atraumatic  Nose: Nose normal    Eyes:      Conjunctiva/sclera: Conjunctivae normal    Neck:      Thyroid: No thyromegaly  Vascular: No JVD  Trachea: No tracheal deviation  Cardiovascular:      Rate and Rhythm: Normal rate and regular rhythm  Heart sounds: Normal heart sounds  No murmur heard  No friction rub  No gallop  Pulmonary:      Effort: Pulmonary effort is normal  No respiratory distress  Breath sounds: Normal breath sounds  No wheezing or rales  Abdominal:      General: Bowel sounds are normal  There is no distension  Palpations: Abdomen is soft  There is no mass  Tenderness: There is no abdominal tenderness  There is no guarding  Hernia: No hernia is present  Musculoskeletal:         General: No tenderness or deformity  Cervical back: Neck supple  Right lower leg: No edema  Left lower leg: No edema  Lymphadenopathy:      Cervical: No cervical adenopathy  Skin:     General: Skin is warm and dry  Findings: No erythema or rash  Neurological:      Mental Status: She is alert and oriented to person, place, and time     Psychiatric:         Mood and Affect: Mood normal          Behavior: Behavior normal  Thought Content: Thought content normal           No results found for: WBC, HGB, HCT, MCV, PLT  No results found for: GLUCOSE, CALCIUM, NA, K, CO2, CL, BUN, CREATININE  No results found for: ALT, AST, GGT, ALKPHOS, BILITOT  No results found for: INR, PROTIME    Patient was never admitted  ASSESSMENT & PLAN:    Rectal bleeding  Appear to be most likely hemorrhoidal bleeding  Rule out colorectal lesions including polyps or malignancy     -advised to avoid straining during defecation    -Schedule for colonoscopy  -High-fiber diet     -Patient was given instructions about the colonoscopy prep     -Patient was explained about  the risks and benefits of the procedure  Risks including but not limited to bleeding, infection, perforation were explained in detail  Also explained about less than 100% sensitivity with the exam and other alternatives            Family history of colon cancer  Patient is at increased risks because of family history of colon cancer in her brother at age 55     -colonoscopy

## 2022-10-14 NOTE — ASSESSMENT & PLAN NOTE
Patient is at increased risks because of family history of colon cancer in her brother at age 55     -colonoscopy

## 2022-10-14 NOTE — ASSESSMENT & PLAN NOTE
Appear to be most likely hemorrhoidal bleeding  Rule out colorectal lesions including polyps or malignancy     -advised to avoid straining during defecation    -Schedule for colonoscopy  -High-fiber diet     -Patient was given instructions about the colonoscopy prep     -Patient was explained about  the risks and benefits of the procedure  Risks including but not limited to bleeding, infection, perforation were explained in detail  Also explained about less than 100% sensitivity with the exam and other alternatives

## 2022-10-17 NOTE — PATIENT INSTRUCTIONS
Scheduled date of colonoscopy (as of today):12/2/2022  Physician performing colonoscopy:DR SHARMA  Location of colonoscopy:[facility]  Bowel prep reviewed with patient:DEANGELO PER DR Misha Mroales  Instructions reviewed with patient by:TASIA  Clearances:

## 2022-11-02 PROBLEM — Z90.89 S/P TONSILLECTOMY: Status: ACTIVE | Noted: 2022-11-02

## 2022-11-22 ENCOUNTER — TELEPHONE (OUTPATIENT)
Dept: GASTROENTEROLOGY | Facility: AMBULARY SURGERY CENTER | Age: 37
End: 2022-11-22

## 2022-12-02 ENCOUNTER — ANESTHESIA (OUTPATIENT)
Dept: GASTROENTEROLOGY | Facility: AMBULARY SURGERY CENTER | Age: 37
End: 2022-12-02

## 2022-12-02 ENCOUNTER — ANESTHESIA EVENT (OUTPATIENT)
Dept: GASTROENTEROLOGY | Facility: AMBULARY SURGERY CENTER | Age: 37
End: 2022-12-02

## 2022-12-02 ENCOUNTER — HOSPITAL ENCOUNTER (OUTPATIENT)
Dept: GASTROENTEROLOGY | Facility: AMBULARY SURGERY CENTER | Age: 37
Setting detail: OUTPATIENT SURGERY
End: 2022-12-02
Attending: INTERNAL MEDICINE

## 2022-12-02 VITALS
HEIGHT: 62 IN | DIASTOLIC BLOOD PRESSURE: 63 MMHG | BODY MASS INDEX: 31.65 KG/M2 | HEART RATE: 87 BPM | WEIGHT: 172 LBS | TEMPERATURE: 98.3 F | RESPIRATION RATE: 18 BRPM | OXYGEN SATURATION: 100 % | SYSTOLIC BLOOD PRESSURE: 102 MMHG

## 2022-12-02 DIAGNOSIS — K62.5 RECTAL BLEEDING: ICD-10-CM

## 2022-12-02 DIAGNOSIS — Z80.0 FAMILY HISTORY OF COLON CANCER: ICD-10-CM

## 2022-12-02 LAB
EXT PREGNANCY TEST URINE: NEGATIVE
EXT. CONTROL: NORMAL

## 2022-12-02 RX ORDER — PROPOFOL 10 MG/ML
INJECTION, EMULSION INTRAVENOUS CONTINUOUS PRN
Status: DISCONTINUED | OUTPATIENT
Start: 2022-12-02 | End: 2022-12-07

## 2022-12-02 RX ORDER — SODIUM CHLORIDE, SODIUM LACTATE, POTASSIUM CHLORIDE, CALCIUM CHLORIDE 600; 310; 30; 20 MG/100ML; MG/100ML; MG/100ML; MG/100ML
INJECTION, SOLUTION INTRAVENOUS CONTINUOUS PRN
Status: DISCONTINUED | OUTPATIENT
Start: 2022-12-02 | End: 2022-12-07

## 2022-12-02 RX ORDER — PROPOFOL 10 MG/ML
INJECTION, EMULSION INTRAVENOUS AS NEEDED
Status: DISCONTINUED | OUTPATIENT
Start: 2022-12-02 | End: 2022-12-07

## 2022-12-02 RX ORDER — LIDOCAINE HYDROCHLORIDE 10 MG/ML
INJECTION, SOLUTION EPIDURAL; INFILTRATION; INTRACAUDAL; PERINEURAL AS NEEDED
Status: DISCONTINUED | OUTPATIENT
Start: 2022-12-02 | End: 2022-12-07

## 2022-12-02 RX ADMIN — PROPOFOL 50 MG: 10 INJECTION, EMULSION INTRAVENOUS at 12:30

## 2022-12-02 RX ADMIN — PROPOFOL 110 MCG/KG/MIN: 10 INJECTION, EMULSION INTRAVENOUS at 12:32

## 2022-12-02 RX ADMIN — PROPOFOL 50 MG: 10 INJECTION, EMULSION INTRAVENOUS at 12:27

## 2022-12-02 RX ADMIN — SODIUM CHLORIDE, SODIUM LACTATE, POTASSIUM CHLORIDE, AND CALCIUM CHLORIDE: .6; .31; .03; .02 INJECTION, SOLUTION INTRAVENOUS at 12:26

## 2022-12-02 RX ADMIN — PROPOFOL 100 MG: 10 INJECTION, EMULSION INTRAVENOUS at 12:26

## 2022-12-02 RX ADMIN — LIDOCAINE HYDROCHLORIDE 50 MG: 10 INJECTION, SOLUTION EPIDURAL; INFILTRATION; INTRACAUDAL; PERINEURAL at 12:27

## 2022-12-02 NOTE — ANESTHESIA PREPROCEDURE EVALUATION
Procedure:  COLONOSCOPY    Relevant Problems   GI/HEPATIC   (+) Rectal bleeding        Physical Exam    Airway    Mallampati score: II  TM Distance: >3 FB  Neck ROM: full     Dental   No notable dental hx     Cardiovascular  Cardiovascular exam normal    Pulmonary  Pulmonary exam normal     Other Findings        Anesthesia Plan  ASA Score- 2     Anesthesia Type- IV sedation with anesthesia with ASA Monitors  Additional Monitors:   Airway Plan:           Plan Factors-Exercise tolerance (METS): >4 METS  Chart reviewed  Imaging results reviewed  Existing labs reviewed  Patient summary reviewed  Patient is not a current smoker  Induction-     Postoperative Plan-     Informed Consent- Anesthetic plan and risks discussed with patient  I personally reviewed this patient with the CRNA  Discussed and agreed on the Anesthesia Plan with the CRNA  Kwame Frank

## 2022-12-02 NOTE — H&P
History and Physical - [facility]  Elizabeth Waldrop 44 y o  female MRN: [id number]        HPI:  69-year-old female with no significant past medical history was seen in the office because of rectal bleeding  Her brother was diagnosed with colon cancer  Historical Information   Past Medical History:   Diagnosis Date   • Hemorrhoid    • Herniation of cervical intervertebral disc      Past Surgical History:   Procedure Laterality Date   • KNEE ARTHROSCOPY Right 2019   • TONSILLECTOMY  10/2022     Social History   Social History     Substance and Sexual Activity   Alcohol Use Yes    Comment: socially     Social History     Substance and Sexual Activity   Drug Use Yes   • Types: Marijuana    Comment: medical     Social History     Tobacco Use   Smoking Status Never   Smokeless Tobacco Never     Family History   Problem Relation Age of Onset   • No Known Problems Mother    • No Known Problems Father    • Colon cancer Brother        Meds/Allergies     (Not in a hospital admission)      No Known Allergies    Objective     Last menstrual period 11/25/2022      PHYSICAL EXAM:    Gen: NAD  CV: S1 & S2 normal, RRR  CHEST: Clear to auscultate  ABD: soft, NT/ND, good bowel sounds  EXT: no edema    ASSESSMENT:     Rectal bleeding, family history of colon cancer    PLAN:    Colonoscopy

## 2022-12-02 NOTE — ANESTHESIA POSTPROCEDURE EVALUATION
Post-Op Assessment Note    CV Status:  Stable  Pain Score: 0    Pain management: adequate     Mental Status:  Alert and awake   Hydration Status:  Euvolemic   PONV Controlled:  Controlled   Airway Patency:  Patent      Post Op Vitals Reviewed: Yes      Staff: CRNA, Anesthesiologist         No notable events documented      BP      Temp      Pulse     Resp      SpO2

## 2022-12-13 DIAGNOSIS — M26.609 TMJ DYSFUNCTION: ICD-10-CM

## 2022-12-13 RX ORDER — CYCLOBENZAPRINE HCL 10 MG
10 TABLET ORAL 2 TIMES DAILY PRN
Qty: 30 TABLET | Refills: 0 | Status: SHIPPED | OUTPATIENT
Start: 2022-12-13

## 2022-12-13 NOTE — TELEPHONE ENCOUNTER
Please review. Protocol failed / No protocol. Requested Prescriptions   Pending Prescriptions Disp Refills    cyclobenzaprine 10 MG Oral Tab 30 tablet 0     Sig: Take 1 tablet (10 mg total) by mouth 2 (two) times daily as needed for Muscle spasms (AVOID DRIVING/ Ether Bence).        There is no refill protocol information for this order            Recent Outpatient Visits              3 months ago Anxiety with depression    [facility], [place] [address], Bonifacio Scott MD    Office Visit    5 months ago Encounter for well woman exam with routine gynecological exam    [facility], [address] [place] [address], Sourav, BILL Edwards    Office Visit    1 year ago TMJ dysfunction    [facility], [facility], Liz Barry MD    Telemedicine    1 year ago TMJ (dislocation of temporomandibular joint), sequela    Danette Hedrick MD    Office Visit    1 year ago Arthralgia of right temporomandibular joint    Danette Hedrick MD    Office Visit

## 2023-08-31 ENCOUNTER — HOSPITAL ENCOUNTER (OUTPATIENT)
Age: 38
Discharge: HOME | End: 2023-08-31
Payer: COMMERCIAL

## 2023-08-31 VITALS
TEMPERATURE: 98 F | DIASTOLIC BLOOD PRESSURE: 86 MMHG | OXYGEN SATURATION: 99 % | RESPIRATION RATE: 18 BRPM | SYSTOLIC BLOOD PRESSURE: 138 MMHG | HEART RATE: 74 BPM

## 2023-08-31 DIAGNOSIS — A09 TRAVELER'S DIARRHEA: Primary | ICD-10-CM

## 2023-08-31 RX ORDER — AZITHROMYCIN 500 MG/1
500 TABLET, FILM COATED ORAL DAILY
Qty: 3 TABLET | Refills: 0 | Status: SHIPPED | OUTPATIENT
Start: 2023-08-31 | End: 2023-09-03

## 2023-08-31 RX ORDER — AZITHROMYCIN 500 MG/1
500 TABLET, FILM COATED ORAL DAILY
Qty: 3 TABLET | Refills: 0 | Status: SHIPPED | OUTPATIENT
Start: 2023-08-31 | End: 2023-08-31

## 2023-10-16 ENCOUNTER — TELEPHONE (OUTPATIENT)
Age: 38
End: 2023-10-16

## 2023-10-16 NOTE — TELEPHONE ENCOUNTER
Rec'd call from patient requesting NEW for [facility].. Explained wait/cancellation list processes and MyChart notification. Understanding was verbalized. Created wait/cancellation list for patient.

## 2023-12-07 ENCOUNTER — LAB REQUISITION (OUTPATIENT)
Dept: LAB | Age: 38
End: 2023-12-07

## 2023-12-07 DIAGNOSIS — Z13.21 ENCOUNTER FOR SCREENING FOR NUTRITIONAL DISORDER: ICD-10-CM

## 2023-12-07 DIAGNOSIS — Z00.01 ENCOUNTER FOR GENERAL ADULT MEDICAL EXAMINATION WITH ABNORMAL FINDINGS: ICD-10-CM

## 2023-12-07 DIAGNOSIS — R63.5 ABNORMAL WEIGHT GAIN: ICD-10-CM

## 2023-12-07 DIAGNOSIS — M79.18 MYALGIA, OTHER SITE: ICD-10-CM

## 2023-12-07 DIAGNOSIS — R14.0 ABDOMINAL DISTENSION (GASEOUS): ICD-10-CM

## 2023-12-07 DIAGNOSIS — K59.00 CONSTIPATION, UNSPECIFIED: ICD-10-CM

## 2023-12-07 DIAGNOSIS — Z13.818 ENCOUNTER FOR SCREENING FOR OTHER DIGESTIVE SYSTEM DISORDERS: ICD-10-CM

## 2023-12-07 DIAGNOSIS — Z13.29 ENCOUNTER FOR SCREENING FOR OTHER SUSPECTED ENDOCRINE DISORDER: ICD-10-CM

## 2023-12-07 DIAGNOSIS — Z13.220 ENCOUNTER FOR SCREENING FOR LIPOID DISORDERS: ICD-10-CM

## 2023-12-07 DIAGNOSIS — Z83.49 FAMILY HISTORY OF OTHER ENDOCRINE, NUTRITIONAL AND METABOLIC DISEASES: ICD-10-CM

## 2023-12-07 DIAGNOSIS — Z80.8 FAMILY HISTORY OF MALIGNANT NEOPLASM OF OTHER ORGANS OR SYSTEMS: ICD-10-CM

## 2023-12-07 DIAGNOSIS — Z13.228 ENCOUNTER FOR SCREENING FOR OTHER METABOLIC DISORDERS: ICD-10-CM

## 2023-12-15 PROCEDURE — PSEU8506 C REACTIVE PROTEIN HIGH SENSITIVITY: Performed by: CLINICAL MEDICAL LABORATORY

## 2023-12-15 PROCEDURE — PSEU8229 VITAMIN D -25 HYDROXY: Performed by: CLINICAL MEDICAL LABORATORY

## 2023-12-15 PROCEDURE — PSEU8115 INSULIN LEVEL, FASTING: Performed by: CLINICAL MEDICAL LABORATORY

## 2023-12-15 PROCEDURE — PSEU8191 DHEA SULFATE: Performed by: CLINICAL MEDICAL LABORATORY

## 2023-12-15 PROCEDURE — 82746 ASSAY OF FOLIC ACID SERUM: CPT | Performed by: CLINICAL MEDICAL LABORATORY

## 2023-12-15 PROCEDURE — 82977 ASSAY OF GGT: CPT | Performed by: CLINICAL MEDICAL LABORATORY

## 2023-12-15 PROCEDURE — 80053 COMPREHEN METABOLIC PANEL: CPT | Performed by: CLINICAL MEDICAL LABORATORY

## 2023-12-15 PROCEDURE — PSEU8258 ESTRADIOL: Performed by: CLINICAL MEDICAL LABORATORY

## 2023-12-15 PROCEDURE — 82607 VITAMIN B-12: CPT | Performed by: CLINICAL MEDICAL LABORATORY

## 2023-12-15 PROCEDURE — 82533 TOTAL CORTISOL: CPT | Performed by: CLINICAL MEDICAL LABORATORY

## 2023-12-15 PROCEDURE — PSEU8187 CORTISOL: Performed by: CLINICAL MEDICAL LABORATORY

## 2023-12-15 PROCEDURE — PSEU8300 THYROXINE: Performed by: CLINICAL MEDICAL LABORATORY

## 2023-12-15 PROCEDURE — 84466 ASSAY OF TRANSFERRIN: CPT | Performed by: CLINICAL MEDICAL LABORATORY

## 2023-12-15 PROCEDURE — 82306 VITAMIN D 25 HYDROXY: CPT | Performed by: CLINICAL MEDICAL LABORATORY

## 2023-12-15 PROCEDURE — PSEU8250 COMPREHENSIVE METABOLIC PANEL: Performed by: CLINICAL MEDICAL LABORATORY

## 2023-12-15 PROCEDURE — 84630 ASSAY OF ZINC: CPT | Performed by: CLINICAL MEDICAL LABORATORY

## 2023-12-15 PROCEDURE — PSEU8556 THYROID ANTIBODIES: Performed by: CLINICAL MEDICAL LABORATORY

## 2023-12-15 PROCEDURE — PSEU8562 TRANSFERRIN: Performed by: CLINICAL MEDICAL LABORATORY

## 2023-12-15 PROCEDURE — 82728 ASSAY OF FERRITIN: CPT | Performed by: CLINICAL MEDICAL LABORATORY

## 2023-12-15 PROCEDURE — 82670 ASSAY OF TOTAL ESTRADIOL: CPT | Performed by: CLINICAL MEDICAL LABORATORY

## 2023-12-15 PROCEDURE — 83735 ASSAY OF MAGNESIUM: CPT | Performed by: CLINICAL MEDICAL LABORATORY

## 2023-12-15 PROCEDURE — 83525 ASSAY OF INSULIN: CPT | Performed by: CLINICAL MEDICAL LABORATORY

## 2023-12-15 PROCEDURE — PSEU8259 FERRITIN: Performed by: CLINICAL MEDICAL LABORATORY

## 2023-12-15 PROCEDURE — 81003 URINALYSIS AUTO W/O SCOPE: CPT | Performed by: CLINICAL MEDICAL LABORATORY

## 2023-12-15 PROCEDURE — 84481 FREE ASSAY (FT-3): CPT | Performed by: CLINICAL MEDICAL LABORATORY

## 2023-12-15 PROCEDURE — 84436 ASSAY OF TOTAL THYROXINE: CPT | Performed by: CLINICAL MEDICAL LABORATORY

## 2023-12-15 PROCEDURE — PSEU8263 GGT: Performed by: CLINICAL MEDICAL LABORATORY

## 2023-12-15 PROCEDURE — 84144 ASSAY OF PROGESTERONE: CPT | Performed by: CLINICAL MEDICAL LABORATORY

## 2023-12-15 PROCEDURE — 83540 ASSAY OF IRON: CPT | Performed by: CLINICAL MEDICAL LABORATORY

## 2023-12-15 PROCEDURE — 85025 COMPLETE CBC W/AUTO DIFF WBC: CPT | Performed by: CLINICAL MEDICAL LABORATORY

## 2023-12-15 PROCEDURE — 86141 C-REACTIVE PROTEIN HS: CPT | Performed by: CLINICAL MEDICAL LABORATORY

## 2023-12-15 PROCEDURE — PSEU8203 IRON AND TOTAL IRON BINDING CAPACITY: Performed by: CLINICAL MEDICAL LABORATORY

## 2023-12-15 PROCEDURE — PSEU8303 URIC ACID: Performed by: CLINICAL MEDICAL LABORATORY

## 2023-12-15 PROCEDURE — PSEU8171 VITAMIN B12 AND FOLATE: Performed by: CLINICAL MEDICAL LABORATORY

## 2023-12-15 PROCEDURE — PSEU8904 ZINC: Performed by: CLINICAL MEDICAL LABORATORY

## 2023-12-15 PROCEDURE — PSEU8286 PROGESTERONE: Performed by: CLINICAL MEDICAL LABORATORY

## 2023-12-15 PROCEDURE — 84550 ASSAY OF BLOOD/URIC ACID: CPT | Performed by: CLINICAL MEDICAL LABORATORY

## 2023-12-15 PROCEDURE — 82627 DEHYDROEPIANDROSTERONE: CPT | Performed by: CLINICAL MEDICAL LABORATORY

## 2023-12-15 PROCEDURE — PSEU8196 FREE T3: Performed by: CLINICAL MEDICAL LABORATORY

## 2023-12-15 PROCEDURE — 86376 MICROSOMAL ANTIBODY EACH: CPT | Performed by: CLINICAL MEDICAL LABORATORY

## 2023-12-15 PROCEDURE — PSEU8088 FOLLICLE STIMULATING & LUTEINIZING HORMONES: Performed by: CLINICAL MEDICAL LABORATORY

## 2023-12-15 PROCEDURE — 86800 THYROGLOBULIN ANTIBODY: CPT | Performed by: CLINICAL MEDICAL LABORATORY

## 2023-12-15 PROCEDURE — 83001 ASSAY OF GONADOTROPIN (FSH): CPT | Performed by: CLINICAL MEDICAL LABORATORY

## 2023-12-15 PROCEDURE — 83002 ASSAY OF GONADOTROPIN (LH): CPT | Performed by: CLINICAL MEDICAL LABORATORY

## 2023-12-15 PROCEDURE — PSEU8274 MAGNESIUM: Performed by: CLINICAL MEDICAL LABORATORY

## 2023-12-16 LAB
25(OH)D3+25(OH)D2 SERPL-MCNC: 9.6 NG/ML (ref 30–100)
ALBUMIN SERPL-MCNC: 3.8 G/DL (ref 3.6–5.1)
ALBUMIN/GLOB SERPL: 1.2 {RATIO} (ref 1–2.4)
ALP SERPL-CCNC: 72 UNITS/L (ref 45–117)
ALT SERPL-CCNC: 26 UNITS/L
ANION GAP SERPL CALC-SCNC: 8 MMOL/L (ref 7–19)
APPEARANCE UR: CLEAR
AST SERPL-CCNC: 14 UNITS/L
BACTERIA #/AREA URNS HPF: NORMAL /HPF
BASOPHILS # BLD: 0.1 K/MCL (ref 0–0.3)
BASOPHILS NFR BLD: 1 %
BILIRUB SERPL-MCNC: 0.5 MG/DL (ref 0.2–1)
BILIRUB UR QL STRIP: NEGATIVE
BUN SERPL-MCNC: 14 MG/DL (ref 6–20)
BUN/CREAT SERPL: 19 (ref 7–25)
CALCIUM SERPL-MCNC: 8.8 MG/DL (ref 8.4–10.2)
CHLORIDE SERPL-SCNC: 106 MMOL/L (ref 97–110)
CO2 SERPL-SCNC: 26 MMOL/L (ref 21–32)
COLOR UR: NORMAL
CORTIS SERPL-MCNC: 8.5 MCG/DL (ref 3.4–22.5)
CREAT SERPL-MCNC: 0.74 MG/DL (ref 0.51–0.95)
CRP SERPL HS-MCNC: 2.05 MG/L
DEPRECATED RDW RBC: 42.8 FL (ref 39–50)
EGFRCR SERPLBLD CKD-EPI 2021: >90 ML/MIN/{1.73_M2}
EOSINOPHIL # BLD: 0.1 K/MCL (ref 0–0.5)
EOSINOPHIL NFR BLD: 2 %
ERYTHROCYTE [DISTWIDTH] IN BLOOD: 12.5 % (ref 11–15)
ESTRADIOL SERPL-MCNC: 412 PG/ML
FASTING DURATION TIME PATIENT: NORMAL H
FASTING DURATION TIME PATIENT: NORMAL H
FERRITIN SERPL-MCNC: 30 NG/ML (ref 8–252)
FOLATE SERPL-MCNC: 18.2 NG/ML
FSH SERPL-ACNC: 4.9 MUNITS/ML
GGT SERPL-CCNC: 20 UNITS/L (ref 5–55)
GLOBULIN SER-MCNC: 3.3 G/DL (ref 2–4)
GLUCOSE SERPL-MCNC: 85 MG/DL (ref 70–99)
GLUCOSE UR STRIP-MCNC: NEGATIVE MG/DL
HCT VFR BLD CALC: 44.5 % (ref 36–46.5)
HGB BLD-MCNC: 15 G/DL (ref 12–15.5)
HGB UR QL STRIP: NEGATIVE
HYALINE CASTS #/AREA URNS LPF: NORMAL /LPF
IMM GRANULOCYTES # BLD AUTO: 0 K/MCL (ref 0–0.2)
IMM GRANULOCYTES # BLD: 0 %
INSULIN P FAST SERPL-ACNC: 10 MUNITS/L (ref 3–28)
IRON SATN MFR SERPL: 19 % (ref 15–45)
IRON SERPL-MCNC: 84 MCG/DL (ref 50–170)
KETONES UR STRIP-MCNC: NEGATIVE MG/DL
LEUKOCYTE ESTERASE UR QL STRIP: NEGATIVE
LH SERPL-ACNC: 6.7 MUNITS/ML
LYMPHOCYTES # BLD: 2.2 K/MCL (ref 1–4.8)
LYMPHOCYTES NFR BLD: 36 %
MAGNESIUM SERPL-MCNC: 2.1 MG/DL (ref 1.7–2.4)
MCH RBC QN AUTO: 31.3 PG (ref 26–34)
MCHC RBC AUTO-ENTMCNC: 33.7 G/DL (ref 32–36.5)
MCV RBC AUTO: 92.9 FL (ref 78–100)
MONOCYTES # BLD: 0.5 K/MCL (ref 0.3–0.9)
MONOCYTES NFR BLD: 8 %
MUCOUS THREADS URNS QL MICRO: PRESENT
NEUTROPHILS # BLD: 3.2 K/MCL (ref 1.8–7.7)
NEUTROPHILS NFR BLD: 53 %
NITRITE UR QL STRIP: NEGATIVE
NRBC BLD MANUAL-RTO: 0 /100 WBC
PH UR STRIP: 5.5 [PH] (ref 5–7)
PLATELET # BLD AUTO: 255 K/MCL (ref 140–450)
POTASSIUM SERPL-SCNC: 4 MMOL/L (ref 3.4–5.1)
PROGEST SERPL-MCNC: 0.51 NG/ML
PROT SERPL-MCNC: 7.1 G/DL (ref 6.4–8.2)
PROT UR STRIP-MCNC: NEGATIVE MG/DL
RAINBOW EXTRA TUBES HOLD SPECIMEN: NORMAL
RBC # BLD: 4.79 MIL/MCL (ref 4–5.2)
RBC #/AREA URNS HPF: NORMAL /HPF
SODIUM SERPL-SCNC: 136 MMOL/L (ref 135–145)
SP GR UR STRIP: 1.02 (ref 1–1.03)
SQUAMOUS #/AREA URNS HPF: NORMAL /HPF
T3FREE SERPL-MCNC: 3 PG/ML (ref 2.2–4)
T4 SERPL-MCNC: 9.2 MCG/DL (ref 4.7–13.3)
THYROGLOB AB SERPL-ACNC: <0.9 IU/ML (ref 0–4)
THYROPEROXIDASE AB SERPL-ACNC: 50 UNITS/ML
TIBC SERPL-MCNC: 437 MCG/DL (ref 250–450)
TRANSFERRIN SERPL-MCNC: 338 MG/DL (ref 200–360)
URATE SERPL-MCNC: 5 MG/DL (ref 2.6–5.9)
UROBILINOGEN UR STRIP-MCNC: 0.2 MG/DL
VIT B12 SERPL-MCNC: 547 PG/ML (ref 211–911)
WBC # BLD: 6.2 K/MCL (ref 4.2–11)
WBC #/AREA URNS HPF: NORMAL /HPF

## 2023-12-18 LAB
DHEA-S SERPL-MCNC: 302.9 MCG/DL (ref 8–391)
ZINC SERPL-MCNC: 89 MCG/DL (ref 70–120)

## 2024-02-23 ENCOUNTER — OFFICE VISIT (OUTPATIENT)
Dept: OBGYN CLINIC | Facility: CLINIC | Age: 39
End: 2024-02-23
Payer: COMMERCIAL

## 2024-02-23 VITALS
WEIGHT: 185 LBS | HEIGHT: 62 IN | HEART RATE: 89 BPM | DIASTOLIC BLOOD PRESSURE: 79 MMHG | BODY MASS INDEX: 34.04 KG/M2 | SYSTOLIC BLOOD PRESSURE: 117 MMHG

## 2024-02-23 DIAGNOSIS — M62.830 LUMBAR PARASPINAL MUSCLE SPASM: ICD-10-CM

## 2024-02-23 DIAGNOSIS — M54.16 RADICULOPATHY, LUMBAR REGION: Primary | ICD-10-CM

## 2024-02-23 PROCEDURE — 99213 OFFICE O/P EST LOW 20 MIN: CPT | Performed by: FAMILY MEDICINE

## 2024-02-23 RX ORDER — METHOCARBAMOL 750 MG/1
750 TABLET, FILM COATED ORAL 2 TIMES DAILY PRN
Qty: 60 TABLET | Refills: 0 | Status: SHIPPED | OUTPATIENT
Start: 2024-02-23

## 2024-02-23 RX ORDER — NAPROXEN 500 MG/1
500 TABLET ORAL 2 TIMES DAILY WITH MEALS
Qty: 60 TABLET | Refills: 0 | Status: SHIPPED | OUTPATIENT
Start: 2024-02-23

## 2024-02-23 NOTE — PROGRESS NOTES
Assessment/Plan:  Assessment/Plan   Diagnoses and all orders for this visit:    Radiculopathy, lumbar region  -     Cancel: XR spine lumbar minimum 4 views non injury; Future  -     XR spine lumbar 2 or 3 views injury; Future  -     naproxen (Naprosyn) 500 mg tablet; Take 1 tablet (500 mg total) by mouth 2 (two) times a day with meals  -     MRI lumbar spine wo contrast; Future    Lumbar paraspinal muscle spasm  -     methocarbamol (Robaxin-750) 750 mg tablet; Take 1 tablet (750 mg total) by mouth 2 (two) times a day as needed for muscle spasms      38-year-old female with low back pain and right lower extremity pain more than few years duration.  Discussed with patient physical exam, radiographs, impression, and plan.  Physical exam lumbar spine negative for midline tenderness L3-S1 and bilateral paraspinal tenderness.  She has limited range of motion with extension and rotating and sidebending to both sides.  She has normal strength and sensation both lower extremities.  Straight leg raise is unremarkable bilaterally. She has decreased patellar reflex bilaterally. There is no groin pain with JENNA and FADDIR maneuvers of the hips.  Clinical impression is that she has symptoms of lumbar spine pathology.  Symptoms have been worsening despite conservative management of more than 6 weeks of formal therapy and continuing home exercises, more than 4 weeks of chiropractic, more than 6 months of Aleve and Tylenol and alternate between ice and heat.  At this time I will refer her for MRI lumbar spine to evaluate for stenosis, disc pathology, and nerve impingement as invasive management may be warranted.  She will return after having MRI done.  In the interim she is to complete her course of steroid and then she is to take naproxen 500 mg twice daily.  She may take methocarbamol 750 mg twice daily as needed but not before driving.      Subjective:   Patient ID: Janet Matute is a 38 y.o. female.  Chief Complaint  "  Patient presents with    Spine - Pain        38-year-old female presents evaluation of low back pain and right lower extremity pain more than few years duration.  She reports having sustained injury many years ago.  She has been experiencing pain described as localized to the lumbosacral aspect of spine but worse on the right, radiating into right lower leg, worse with bending and twisting, associated with spasm, and improved with resting.  She has tried treatment with formal therapy and chiropractic without improvement.  She is status post breast reduction 4/10/2023 but states she still continues with low back pain.  Following breast reduction she continued formal therapy and has been doing home exercises with stretching on daily basis.  She has seen chiropractor and had adjustments done without improvement.  She has been managing symptoms with taking Aleve and Tylenol daily for the past 3 months and also alternating with ice and heat.  She is currently on course of oral steroid.    Back Pain  This is a chronic problem. The current episode started more than 1 year ago. The problem occurs daily. The problem has been gradually worsening. Pertinent negatives include no arthralgias, joint swelling, numbness or weakness. The symptoms are aggravated by bending and twisting. She has tried rest, position changes, NSAIDs, ice, heat and acetaminophen (Weight loss/breast reduction, physical therapy, chiropractic) for the symptoms. The treatment provided no relief.               Review of Systems   Musculoskeletal:  Positive for back pain. Negative for arthralgias and joint swelling.   Neurological:  Negative for weakness and numbness.       Objective:  Vitals:    02/23/24 1459   BP: 117/79   Pulse: 89   Weight: 83.9 kg (185 lb)   Height: 5' 2\" (1.575 m)      Right Ankle Exam     Muscle Strength   Dorsiflexion:  5/5  Plantar flexion:  5/5       Left Ankle Exam     Muscle Strength   Dorsiflexion:  5/5   Plantar flexion:  5/5 "       Right Hip Exam     Muscle Strength   Flexion: 5/5     Tests   JENNA: negative    Comments:  Negative FADDIR      Left Hip Exam     Muscle Strength   Flexion: 5/5     Tests   JENNA: negative    Comments:  Negative FADDIR      Back Exam     Tenderness   The patient is experiencing tenderness in the lumbar.    Range of Motion   Extension:  abnormal   Flexion:  normal   Lateral bend right:  abnormal   Lateral bend left:  abnormal   Rotation right:  abnormal   Rotation left:  abnormal     Muscle Strength   Right Quadriceps:  5/5   Left Quadriceps:  5/5     Tests   Straight leg raise right: negative  Straight leg raise left: negative    Reflexes   Patellar:  Hyporeflexic          Strength/Myotome Testing     Left Ankle/Foot   Dorsiflexion: 5  Plantar flexion: 5    Right Ankle/Foot   Dorsiflexion: 5  Plantar flexion: 5      Physical Exam  Vitals and nursing note reviewed.   Constitutional:       Appearance: Normal appearance. She is well-developed. She is not ill-appearing or diaphoretic.   HENT:      Head: Normocephalic and atraumatic.      Right Ear: External ear normal.      Left Ear: External ear normal.   Eyes:      Conjunctiva/sclera: Conjunctivae normal.   Neck:      Trachea: No tracheal deviation.   Cardiovascular:      Rate and Rhythm: Normal rate.   Pulmonary:      Effort: Pulmonary effort is normal. No respiratory distress.   Abdominal:      General: There is no distension.   Musculoskeletal:         General: Tenderness present.      Lumbar back: Negative right straight leg raise test and negative left straight leg raise test.   Skin:     General: Skin is warm and dry.      Coloration: Skin is not jaundiced or pale.   Neurological:      Mental Status: She is alert and oriented to person, place, and time.   Psychiatric:         Mood and Affect: Mood normal.         Behavior: Behavior normal.         Thought Content: Thought content normal.         Judgment: Judgment normal.

## 2024-02-27 ENCOUNTER — TELEPHONE (OUTPATIENT)
Age: 39
End: 2024-02-27

## 2024-02-27 NOTE — TELEPHONE ENCOUNTER
Caller: [facility]     Doctor: Efrem     Reason for call: Patient will need auth for her MRI please call [facility] for auth , Please call patient once completed   Kimberly [phone]     Call back#: [phone]

## 2024-02-29 NOTE — TELEPHONE ENCOUNTER
Spoke to pt/PT CONFIRMED SHE RECEIVED EMAILED BOWEL PREP INSTRUCTIONS-MIRALAX/DULCOLAX pt is scheduled for 12/2/2022 at [place] [address] w/ Dr Niurka Bateman ,

## 2024-03-11 ENCOUNTER — NURSE TRIAGE (OUTPATIENT)
Dept: FAMILY MEDICINE CLINIC | Facility: CLINIC | Age: 39
End: 2024-03-11

## 2024-03-11 PROBLEM — R06.83 SNORING: Status: ACTIVE | Noted: 2024-03-11

## 2024-03-11 PROBLEM — R06.02 SHORTNESS OF BREATH: Status: ACTIVE | Noted: 2024-03-11

## 2024-03-11 NOTE — TELEPHONE ENCOUNTER
Action Requested: Summary for Provider     []  Critical Lab, Recommendations Needed  [] Need Additional Advice  [x]   FYI    []   Need Orders  [] Need Medications Sent to Pharmacy  []  Other     SUMMARY: Per protocol, patient should be seen in the office within 3 days. Appointment scheduled.    Future Appointments   Date Time Provider Department Center   3/12/2024  8:20 AM Theodore Hicks APRN [facility]     Reason for call: Shortness Of Breath  Onset: 2 Months Ago    Patient reports of shortness of breath for the past few months. She notices this mainly when she sings, having issues extending the notes longer. Also when having conversations, and gets winded going up and down the stairs. Speaks in full sentences during this encounter. No shortness of breath.    States that spouse also noticed heavy snoring at night. Patient admits that she has woken herself up from snoring on occasion. Denies any other complaints at this time. Advised to reschedule appointment with a provider who has sooner availability. Care advice given per protocol. Patient verbalized understanding and agreed with plan of care.     Reason for Disposition   MODERATE longstanding difficulty breathing (e.g., speaks in phrases, SOB even at rest, pulse 100-120) and SAME as normal    Protocols used: Breathing Difficulty-A-[place]

## 2024-03-11 NOTE — PROGRESS NOTES
Subjective:   Oriana Sanchez is a 38 year old female who presents for Shortness Of Breath (Snoring , present for months )   Patient is a pleasant 38-year-old female with no significant past medical history.  Patient presents to office today for evaluation of shortness of breath and snoring.  She notices she is having more shortness of breath. 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  also states she has been snoring more as of late. She reportes getting winded when having long conversations, does report being winded with stairs and exercise. She can not specify when this started, estimate is 6-9 months and has stayed the same. Does not think it is getting any worse. Recent chest cold. She reports congestion when laying flat, feels restricted.     Review of telephone encounter on 3/11/2024 shows: Patient reports shortness of breath for the past few months. She notices this mainly when she sings, having issues extending the notes longer. Also when having conversations, and gets winded going up and down the stairs. States that spouse also noticed heavy snoring at night. Patient admits that she has woken herself up from snoring on occasion. Patient states in office today        History reviewed. No pertinent past medical history.   Past Surgical History:   Procedure Laterality Date           delivery only      Tubal ligation          History/Other:    Chief Complaint Reviewed and Verified  Nursing Notes Reviewed and   Verified  Tobacco Reviewed  Allergies Reviewed  Medications Reviewed    Problem List Reviewed  Medical History Reviewed  Surgical History   Reviewed  OB Status Reviewed  Family History Reviewed         Tobacco:  She has never smoked tobacco.    Current Outpatient Medications   Medication Sig Dispense Refill    predniSONE 20 MG Oral Tab Take 2 tablets once a day for 5 days 10 tablet 0    albuterol 108 (90 Base) MCG/ACT  Inhalation Aero Soln Inhale 2 puffs into the lungs every 4 (four) hours as needed for Wheezing or Shortness of Breath. 18 g 5    cetirizine 10 MG Oral Tab Take 1 tablet (10 mg total) by mouth daily. 90 tablet 3    sodium chloride 0.65 % Nasal Solution 1 spray by Nasal route as needed for congestion. 15 mL 0    cyclobenzaprine 10 MG Oral Tab Take 1 tablet (10 mg total) by mouth 2 (two) times daily as needed for Muscle spasms (AVOID DRIVING/ EVITE MANEJAR). (Patient not taking: Reported on 3/12/2024) 30 tablet 0         Review of Systems:  Review of Systems   Constitutional: Negative.  Negative for activity change, appetite change and fever.   HENT:  Positive for congestion and postnasal drip. Negative for rhinorrhea, sore throat, tinnitus and voice change.    Eyes: Negative.    Respiratory:  Positive for chest tightness and shortness of breath. Negative for apnea and cough.    Cardiovascular:  Negative for chest pain and leg swelling.   Gastrointestinal: Negative.  Negative for abdominal pain, anal bleeding and blood in stool.   Genitourinary: Negative.  Negative for difficulty urinating, flank pain and menstrual problem.   Musculoskeletal: Negative.  Negative for joint swelling.   Skin: Negative.    Neurological: Negative.  Negative for dizziness and headaches.   Psychiatric/Behavioral: Negative.  Negative for agitation.          Objective:   /77   Pulse 80   Ht 5' 4\" (1.626 m)   Wt 203 lb 6.4 oz (92.3 kg)   LMP 02/11/2024 (Exact Date)   BMI 34.91 kg/m²  Estimated body mass index is 34.91 kg/m² as calculated from the following:    Height as of this encounter: 5' 4\" (1.626 m).    Weight as of this encounter: 203 lb 6.4 oz (92.3 kg).  Physical Exam  Vitals and nursing note reviewed.   Constitutional:       General: She is not in acute distress.     Appearance: Normal appearance. She is well-developed.   HENT:      Head: Normocephalic and atraumatic.      Right Ear: Tympanic membrane, ear canal and external  ear normal.      Left Ear: Tympanic membrane, ear canal and external ear normal.      Nose: Congestion present. No rhinorrhea.      Comments: Dry, pale, boggy, with bloody dryness bilat in areas     Mouth/Throat:      Pharynx: Posterior oropharyngeal erythema present.      Tonsils: 3+ on the right. 3+ on the left.      Comments: Left tonsil stone  Eyes:      Conjunctiva/sclera: Conjunctivae normal.      Pupils: Pupils are equal, round, and reactive to light.   Neck:      Thyroid: No thyromegaly.   Cardiovascular:      Rate and Rhythm: Normal rate and regular rhythm.      Pulses: Normal pulses.      Heart sounds: Normal heart sounds. No murmur heard.  Pulmonary:      Effort: Pulmonary effort is normal. No respiratory distress.      Breath sounds: Normal breath sounds. No stridor. No wheezing, rhonchi or rales.   Chest:      Chest wall: No tenderness.   Abdominal:      General: Bowel sounds are normal. There is no distension.      Palpations: Abdomen is soft.      Tenderness: There is no abdominal tenderness.   Musculoskeletal:         General: No tenderness. Normal range of motion.      Cervical back: Normal range of motion and neck supple.   Lymphadenopathy:      Cervical: No cervical adenopathy.   Skin:     General: Skin is warm and dry.      Capillary Refill: Capillary refill takes less than 2 seconds.      Findings: No rash.   Neurological:      Mental Status: She is alert and oriented to person, place, and time.      Coordination: Coordination normal.   Psychiatric:         Behavior: Behavior normal.         Thought Content: Thought content normal.         Judgment: Judgment normal.           Assessment & Plan:   1. Snoring (Primary)  Assessment & Plan:  Likely related to swelling in tonsils and adenoids.  Work up in progress.  ENT referral as needed.    Patient aware of plan of care. All questions answered to satisfaction of the patient. Patient instructed to call office or reach out via [facility] if any issues  arise. For urgent issues and/or reviewed red flags please proceed to the urgent care or ER.  Also, inform the nurse practitioner with any new symptoms or medication side effects.        2. Shortness of breath  Assessment & Plan:  Reports she has lost wind.  Patient with swollen tonsils and adenoids on exam +2-3.   Unsure if she has had large tonsils in the past.  Pred burst, monospot, ebv, and cbc.  Start cetrizine for AR.  Consider ENT referral if predburst unsuccessful.   Orders:  -     predniSONE; Take 2 tablets once a day for 5 days  Dispense: 10 tablet; Refill: 0  3. Chest tightness  Assessment & Plan:  Reports tightness at night, worse when laying flat.  No appreciable wheezing on exam  Rescue albuterol inhaler as needed  Orders:  -     predniSONE; Take 2 tablets once a day for 5 days  Dispense: 10 tablet; Refill: 0  -     Albuterol Sulfate HFA; Inhale 2 puffs into the lungs every 4 (four) hours as needed for Wheezing or Shortness of Breath.  Dispense: 18 g; Refill: 5  4. Swelling of tonsil  Assessment & Plan:  Pred burst.  EBV, CBC, monospot  Follow up 1 week   Orders:  -     predniSONE; Take 2 tablets once a day for 5 days  Dispense: 10 tablet; Refill: 0  -     EBV, Chronic/Active Infection,IgG/IgM; Future; Expected date: 03/12/2024  -     Mononucleosis Test,Screen  -     CBC With Differential With Platelet; Future; Expected date: 03/12/2024  5. Tonsil stone  Assessment & Plan:  Educated on stones.  Will attempt to dislodge at home.  Waterpik  great for this.  6. Nose dryness  Assessment & Plan:  Saline nasal spray  Humidifer.  Place neosporin in nose after saline nasal spray to maintain moisture.  Orders:  -     Saline Nasal Spray; 1 spray by Nasal route as needed for congestion.  Dispense: 15 mL; Refill: 0  7. Allergic rhinitis, unspecified seasonality, unspecified trigger  Assessment & Plan:  Start cetrizine daily.  Saline nasal spray.  Pred burst, reeval in one week.  Orders:  -     Cetirizine  HCl; Take 1 tablet (10 mg total) by mouth daily.  Dispense: 90 tablet; Refill: 3        Return in about 1 week (around 3/19/2024).    BILL Ontiveros, 3/11/2024, 12:50 PM

## 2024-03-11 NOTE — TELEPHONE ENCOUNTER
Patient scheduled an appt via [facility] for 04/09/2024 for the following concerns:    Shortness of breath, wheezing, new onset of snoring.

## 2024-03-12 ENCOUNTER — LAB ENCOUNTER (OUTPATIENT)
Dept: LAB | Age: 39
End: 2024-03-12
Payer: COMMERCIAL

## 2024-03-12 ENCOUNTER — OFFICE VISIT (OUTPATIENT)
Dept: FAMILY MEDICINE CLINIC | Facility: CLINIC | Age: 39
End: 2024-03-12

## 2024-03-12 VITALS
HEIGHT: 64 IN | HEART RATE: 80 BPM | DIASTOLIC BLOOD PRESSURE: 77 MMHG | BODY MASS INDEX: 34.72 KG/M2 | WEIGHT: 203.38 LBS | SYSTOLIC BLOOD PRESSURE: 116 MMHG

## 2024-03-12 DIAGNOSIS — R07.89 CHEST TIGHTNESS: ICD-10-CM

## 2024-03-12 DIAGNOSIS — J34.89 NOSE DRYNESS: ICD-10-CM

## 2024-03-12 DIAGNOSIS — J30.9 ALLERGIC RHINITIS, UNSPECIFIED SEASONALITY, UNSPECIFIED TRIGGER: ICD-10-CM

## 2024-03-12 DIAGNOSIS — J35.1 SWELLING OF TONSIL: ICD-10-CM

## 2024-03-12 DIAGNOSIS — R06.83 SNORING: Primary | ICD-10-CM

## 2024-03-12 DIAGNOSIS — R06.02 SHORTNESS OF BREATH: ICD-10-CM

## 2024-03-12 DIAGNOSIS — J35.8 TONSIL STONE: ICD-10-CM

## 2024-03-12 LAB
BASOPHILS # BLD AUTO: 0.04 X10(3) UL (ref 0–0.2)
BASOPHILS NFR BLD AUTO: 1 %
DEPRECATED RDW RBC AUTO: 41.2 FL (ref 35.1–46.3)
EOSINOPHIL # BLD AUTO: 0.1 X10(3) UL (ref 0–0.7)
EOSINOPHIL NFR BLD AUTO: 2.6 %
ERYTHROCYTE [DISTWIDTH] IN BLOOD BY AUTOMATED COUNT: 12.5 % (ref 11–15)
HCT VFR BLD AUTO: 42.7 %
HGB BLD-MCNC: 14.6 G/DL
IMM GRANULOCYTES # BLD AUTO: 0.01 X10(3) UL (ref 0–1)
IMM GRANULOCYTES NFR BLD: 0.3 %
LYMPHOCYTES # BLD AUTO: 1.38 X10(3) UL (ref 1–4)
LYMPHOCYTES NFR BLD AUTO: 35.8 %
MCH RBC QN AUTO: 30.7 PG (ref 26–34)
MCHC RBC AUTO-ENTMCNC: 34.2 G/DL (ref 31–37)
MCV RBC AUTO: 89.9 FL
MONOCYTES # BLD AUTO: 0.29 X10(3) UL (ref 0.1–1)
MONOCYTES NFR BLD AUTO: 7.5 %
NEUTROPHILS # BLD AUTO: 2.03 X10 (3) UL (ref 1.5–7.7)
NEUTROPHILS # BLD AUTO: 2.03 X10(3) UL (ref 1.5–7.7)
NEUTROPHILS NFR BLD AUTO: 52.8 %
PLATELET # BLD AUTO: 277 10(3)UL (ref 150–450)
RBC # BLD AUTO: 4.75 X10(6)UL
WBC # BLD AUTO: 3.9 X10(3) UL (ref 4–11)

## 2024-03-12 PROCEDURE — 3078F DIAST BP <80 MM HG: CPT

## 2024-03-12 PROCEDURE — 3008F BODY MASS INDEX DOCD: CPT

## 2024-03-12 PROCEDURE — 36415 COLL VENOUS BLD VENIPUNCTURE: CPT

## 2024-03-12 PROCEDURE — 3074F SYST BP LT 130 MM HG: CPT

## 2024-03-12 PROCEDURE — 99214 OFFICE O/P EST MOD 30 MIN: CPT

## 2024-03-12 PROCEDURE — 85025 COMPLETE CBC W/AUTO DIFF WBC: CPT

## 2024-03-12 RX ORDER — PREDNISONE 20 MG/1
TABLET ORAL
Qty: 10 TABLET | Refills: 0 | Status: SHIPPED | OUTPATIENT
Start: 2024-03-12

## 2024-03-12 RX ORDER — ECHINACEA PURPUREA EXTRACT 125 MG
1 TABLET ORAL AS NEEDED
Qty: 15 ML | Refills: 0 | Status: SHIPPED | OUTPATIENT
Start: 2024-03-12

## 2024-03-12 RX ORDER — ALBUTEROL SULFATE 90 UG/1
2 AEROSOL, METERED RESPIRATORY (INHALATION) EVERY 4 HOURS PRN
Qty: 18 G | Refills: 5 | Status: SHIPPED | OUTPATIENT
Start: 2024-03-12

## 2024-03-12 RX ORDER — CETIRIZINE HYDROCHLORIDE 10 MG/1
10 TABLET ORAL DAILY
Qty: 90 TABLET | Refills: 3 | Status: SHIPPED | OUTPATIENT
Start: 2024-03-12

## 2024-03-12 NOTE — PATIENT INSTRUCTIONS
Rhinitis is often caused by environmental allergies, try to find out what triggers your symptoms. Take steps to avoid your triggers.  Avoid yard work. It can stir up both pollen and mold.  Do not smoke or allow others to smoke around you. If you need help quitting, let me know, there are options we can discuss to help in your cessation.  Do not use aerosol sprays, cleaning products, or perfumes.  If pollen is one of your triggers, close your house and car windows during blooming season.  Clean your house often to control dust.  Over-the-counter medicines can help relieve symptoms, Flonase intranasal spray, and a daily allergy pill (claritin, zyrtec) follow the directions listed.   Use saline (saltwater) nasal washes to help keep your nasal passages open and wash out mucus and allergens. You can buy saline nose sprays at a grocery store or drugstore.  If your symptoms persist we can always try adding prescription medications and or an allergy referral.

## 2024-03-12 NOTE — ASSESSMENT & PLAN NOTE
Educated on stones.  Will attempt to dislodge at home.  Waterpik  great for this.  
Likely related to swelling in tonsils and adenoids.  Work up in progress.  ENT referral as needed.    Patient aware of plan of care. All questions answered to satisfaction of the patient. Patient instructed to call office or reach out via [facility] if any issues arise. For urgent issues and/or reviewed red flags please proceed to the urgent care or ER.  Also, inform the nurse practitioner with any new symptoms or medication side effects.        
Pred burst.  EBV, CBC, monospot  Follow up 1 week   
Reports she has lost wind.  Patient with swollen tonsils and adenoids on exam +2-3.   Unsure if she has had large tonsils in the past.  Pred burst, monospot, ebv, and cbc.  Start cetrizine for AR.  Consider ENT referral if predburst unsuccessful.   
Reports tightness at night, worse when laying flat.  No appreciable wheezing on exam  Rescue albuterol inhaler as needed  
Saline nasal spray  Humidifer.  Place neosporin in nose after saline nasal spray to maintain moisture.  
Start cetrizine daily.  Saline nasal spray.  Pred burst, reeval in one week.  
oral

## 2024-03-28 ENCOUNTER — LAB REQUISITION (OUTPATIENT)
Dept: LAB | Age: 39
End: 2024-03-28

## 2024-03-28 DIAGNOSIS — R63.5 ABNORMAL WEIGHT GAIN: ICD-10-CM

## 2024-03-28 DIAGNOSIS — K59.00 CONSTIPATION, UNSPECIFIED: ICD-10-CM

## 2024-03-28 DIAGNOSIS — R14.0 ABDOMINAL DISTENSION (GASEOUS): ICD-10-CM

## 2024-03-28 DIAGNOSIS — K31.89 OTHER DISEASES OF STOMACH AND DUODENUM: ICD-10-CM

## 2024-03-28 DIAGNOSIS — E55.9 VITAMIN D DEFICIENCY, UNSPECIFIED: ICD-10-CM

## 2024-03-28 DIAGNOSIS — E63.9 NUTRITIONAL DEFICIENCY, UNSPECIFIED: ICD-10-CM

## 2024-03-28 DIAGNOSIS — R53.83 OTHER FATIGUE: ICD-10-CM

## 2024-04-05 PROCEDURE — PSEU8266 GLYCOHEMOGLOBIN: Performed by: CLINICAL MEDICAL LABORATORY

## 2024-04-05 PROCEDURE — PSEU8088 FOLLICLE STIMULATING & LUTEINIZING HORMONES: Performed by: CLINICAL MEDICAL LABORATORY

## 2024-04-05 PROCEDURE — PSEU9835 TESTOSTERONE, BIOAVAILABLE, SEX BINDING GLOBULIN, AND TOTAL, FEMALE OR CHILD: Performed by: CLINICAL MEDICAL LABORATORY

## 2024-04-05 PROCEDURE — PSEU8171 VITAMIN B12 AND FOLATE: Performed by: CLINICAL MEDICAL LABORATORY

## 2024-04-05 PROCEDURE — 82607 VITAMIN B-12: CPT | Performed by: CLINICAL MEDICAL LABORATORY

## 2024-04-05 PROCEDURE — PSEU8262 FREE T4: Performed by: CLINICAL MEDICAL LABORATORY

## 2024-04-05 PROCEDURE — 85025 COMPLETE CBC W/AUTO DIFF WBC: CPT | Performed by: CLINICAL MEDICAL LABORATORY

## 2024-04-05 PROCEDURE — 83550 IRON BINDING TEST: CPT | Performed by: CLINICAL MEDICAL LABORATORY

## 2024-04-05 PROCEDURE — PSEU8556 THYROID ANTIBODIES: Performed by: CLINICAL MEDICAL LABORATORY

## 2024-04-05 PROCEDURE — 84630 ASSAY OF ZINC: CPT | Performed by: CLINICAL MEDICAL LABORATORY

## 2024-04-05 PROCEDURE — PSEU8506 C REACTIVE PROTEIN HIGH SENSITIVITY: Performed by: CLINICAL MEDICAL LABORATORY

## 2024-04-05 PROCEDURE — 86800 THYROGLOBULIN ANTIBODY: CPT | Performed by: CLINICAL MEDICAL LABORATORY

## 2024-04-05 PROCEDURE — 80053 COMPREHEN METABOLIC PANEL: CPT | Performed by: CLINICAL MEDICAL LABORATORY

## 2024-04-05 PROCEDURE — 83735 ASSAY OF MAGNESIUM: CPT | Performed by: CLINICAL MEDICAL LABORATORY

## 2024-04-05 PROCEDURE — 82728 ASSAY OF FERRITIN: CPT | Performed by: CLINICAL MEDICAL LABORATORY

## 2024-04-05 PROCEDURE — 84443 ASSAY THYROID STIM HORMONE: CPT | Performed by: CLINICAL MEDICAL LABORATORY

## 2024-04-05 PROCEDURE — 83036 HEMOGLOBIN GLYCOSYLATED A1C: CPT | Performed by: CLINICAL MEDICAL LABORATORY

## 2024-04-05 PROCEDURE — PSEU8299 THYROID STIMULATING HORMONE: Performed by: CLINICAL MEDICAL LABORATORY

## 2024-04-05 PROCEDURE — 82306 VITAMIN D 25 HYDROXY: CPT | Performed by: CLINICAL MEDICAL LABORATORY

## 2024-04-05 PROCEDURE — PSEU8274 MAGNESIUM: Performed by: CLINICAL MEDICAL LABORATORY

## 2024-04-05 PROCEDURE — PSEU8187 CORTISOL: Performed by: CLINICAL MEDICAL LABORATORY

## 2024-04-05 PROCEDURE — PSEU8203 IRON AND TOTAL IRON BINDING CAPACITY: Performed by: CLINICAL MEDICAL LABORATORY

## 2024-04-05 PROCEDURE — 82977 ASSAY OF GGT: CPT | Performed by: CLINICAL MEDICAL LABORATORY

## 2024-04-05 PROCEDURE — 83002 ASSAY OF GONADOTROPIN (LH): CPT | Performed by: CLINICAL MEDICAL LABORATORY

## 2024-04-05 PROCEDURE — 81003 URINALYSIS AUTO W/O SCOPE: CPT | Performed by: CLINICAL MEDICAL LABORATORY

## 2024-04-05 PROCEDURE — 82627 DEHYDROEPIANDROSTERONE: CPT | Performed by: CLINICAL MEDICAL LABORATORY

## 2024-04-05 PROCEDURE — PSEU8135 LIPID PANEL WITH REFLEX: Performed by: CLINICAL MEDICAL LABORATORY

## 2024-04-05 PROCEDURE — 82533 TOTAL CORTISOL: CPT | Performed by: CLINICAL MEDICAL LABORATORY

## 2024-04-05 PROCEDURE — 86376 MICROSOMAL ANTIBODY EACH: CPT | Performed by: CLINICAL MEDICAL LABORATORY

## 2024-04-05 PROCEDURE — 82670 ASSAY OF TOTAL ESTRADIOL: CPT | Performed by: CLINICAL MEDICAL LABORATORY

## 2024-04-05 PROCEDURE — PSEU8191 DHEA SULFATE: Performed by: CLINICAL MEDICAL LABORATORY

## 2024-04-05 PROCEDURE — PSEU8263 GGT: Performed by: CLINICAL MEDICAL LABORATORY

## 2024-04-05 PROCEDURE — 84439 ASSAY OF FREE THYROXINE: CPT | Performed by: CLINICAL MEDICAL LABORATORY

## 2024-04-05 PROCEDURE — 83525 ASSAY OF INSULIN: CPT | Performed by: CLINICAL MEDICAL LABORATORY

## 2024-04-05 PROCEDURE — 83540 ASSAY OF IRON: CPT | Performed by: CLINICAL MEDICAL LABORATORY

## 2024-04-05 PROCEDURE — PSEU8258 ESTRADIOL: Performed by: CLINICAL MEDICAL LABORATORY

## 2024-04-05 PROCEDURE — 80061 LIPID PANEL: CPT | Performed by: CLINICAL MEDICAL LABORATORY

## 2024-04-05 PROCEDURE — PSEU8115 INSULIN LEVEL, FASTING: Performed by: CLINICAL MEDICAL LABORATORY

## 2024-04-05 PROCEDURE — PSEU8303 URIC ACID: Performed by: CLINICAL MEDICAL LABORATORY

## 2024-04-05 PROCEDURE — PSEU8904 ZINC: Performed by: CLINICAL MEDICAL LABORATORY

## 2024-04-05 PROCEDURE — PSEU8250 COMPREHENSIVE METABOLIC PANEL: Performed by: CLINICAL MEDICAL LABORATORY

## 2024-04-05 PROCEDURE — 84402 ASSAY OF FREE TESTOSTERONE: CPT | Performed by: CLINICAL MEDICAL LABORATORY

## 2024-04-05 PROCEDURE — 83001 ASSAY OF GONADOTROPIN (FSH): CPT | Performed by: CLINICAL MEDICAL LABORATORY

## 2024-04-05 PROCEDURE — PSEU8196 FREE T3: Performed by: CLINICAL MEDICAL LABORATORY

## 2024-04-05 PROCEDURE — 86141 C-REACTIVE PROTEIN HS: CPT | Performed by: CLINICAL MEDICAL LABORATORY

## 2024-04-05 PROCEDURE — PSEU8229 VITAMIN D -25 HYDROXY: Performed by: CLINICAL MEDICAL LABORATORY

## 2024-04-05 PROCEDURE — PSEU8268 LACTATE DEHYDROGENASE: Performed by: CLINICAL MEDICAL LABORATORY

## 2024-04-05 PROCEDURE — 84403 ASSAY OF TOTAL TESTOSTERONE: CPT | Performed by: CLINICAL MEDICAL LABORATORY

## 2024-04-05 PROCEDURE — 83615 LACTATE (LD) (LDH) ENZYME: CPT | Performed by: CLINICAL MEDICAL LABORATORY

## 2024-04-05 PROCEDURE — PSEU8286 PROGESTERONE: Performed by: CLINICAL MEDICAL LABORATORY

## 2024-04-05 PROCEDURE — 82746 ASSAY OF FOLIC ACID SERUM: CPT | Performed by: CLINICAL MEDICAL LABORATORY

## 2024-04-05 PROCEDURE — 84480 ASSAY TRIIODOTHYRONINE (T3): CPT | Performed by: CLINICAL MEDICAL LABORATORY

## 2024-04-05 PROCEDURE — PSEU8223 T3, TOTAL: Performed by: CLINICAL MEDICAL LABORATORY

## 2024-04-05 PROCEDURE — 84144 ASSAY OF PROGESTERONE: CPT | Performed by: CLINICAL MEDICAL LABORATORY

## 2024-04-05 PROCEDURE — 84481 FREE ASSAY (FT-3): CPT | Performed by: CLINICAL MEDICAL LABORATORY

## 2024-04-05 PROCEDURE — PSEU8259 FERRITIN: Performed by: CLINICAL MEDICAL LABORATORY

## 2024-04-05 PROCEDURE — 84550 ASSAY OF BLOOD/URIC ACID: CPT | Performed by: CLINICAL MEDICAL LABORATORY

## 2024-04-05 PROCEDURE — 84270 ASSAY OF SEX HORMONE GLOBUL: CPT | Performed by: CLINICAL MEDICAL LABORATORY

## 2024-04-06 LAB
25(OH)D3+25(OH)D2 SERPL-MCNC: 22.7 NG/ML (ref 30–100)
ALBUMIN SERPL-MCNC: 4 G/DL (ref 3.6–5.1)
ALBUMIN/GLOB SERPL: 1.3 {RATIO} (ref 1–2.4)
ALP SERPL-CCNC: 80 UNITS/L (ref 45–117)
ALT SERPL-CCNC: 38 UNITS/L
ANION GAP SERPL CALC-SCNC: 9 MMOL/L (ref 7–19)
APPEARANCE UR: CLEAR
AST SERPL-CCNC: 17 UNITS/L
BASOPHILS # BLD: 0 K/MCL (ref 0–0.3)
BASOPHILS NFR BLD: 1 %
BILIRUB SERPL-MCNC: 0.4 MG/DL (ref 0.2–1)
BILIRUB UR QL STRIP: NEGATIVE
BUN SERPL-MCNC: 17 MG/DL (ref 6–20)
BUN/CREAT SERPL: 22 (ref 7–25)
CALCIUM SERPL-MCNC: 9.2 MG/DL (ref 8.4–10.2)
CHLORIDE SERPL-SCNC: 106 MMOL/L (ref 97–110)
CHOLEST SERPL-MCNC: 290 MG/DL
CHOLEST/HDLC SERPL: 4.1 {RATIO}
CO2 SERPL-SCNC: 27 MMOL/L (ref 21–32)
COLOR UR: NORMAL
CORTIS SERPL-MCNC: 17 MCG/DL (ref 3.4–22.5)
CREAT SERPL-MCNC: 0.77 MG/DL (ref 0.51–0.95)
CRP SERPL HS-MCNC: 2.4 MG/L
DEPRECATED RDW RBC: 43.6 FL (ref 39–50)
EGFRCR SERPLBLD CKD-EPI 2021: >90 ML/MIN/{1.73_M2}
EOSINOPHIL # BLD: 0.1 K/MCL (ref 0–0.5)
EOSINOPHIL NFR BLD: 3 %
ERYTHROCYTE [DISTWIDTH] IN BLOOD: 12.6 % (ref 11–15)
ESTRADIOL SERPL-MCNC: 260 PG/ML
FASTING DURATION TIME PATIENT: NORMAL H
FASTING DURATION TIME PATIENT: NORMAL H
FERRITIN SERPL-MCNC: 17 NG/ML (ref 8–252)
FOLATE SERPL-MCNC: 9.1 NG/ML
FSH SERPL-ACNC: 6.5 MUNITS/ML
GGT SERPL-CCNC: 25 UNITS/L (ref 5–55)
GLOBULIN SER-MCNC: 3.1 G/DL (ref 2–4)
GLUCOSE SERPL-MCNC: 88 MG/DL (ref 70–99)
GLUCOSE UR STRIP-MCNC: NEGATIVE MG/DL
HBA1C MFR BLD: 5.2 % (ref 4.5–5.6)
HCT VFR BLD CALC: 46.1 % (ref 36–46.5)
HDLC SERPL-MCNC: 70 MG/DL
HGB BLD-MCNC: 15.1 G/DL (ref 12–15.5)
HGB UR QL STRIP: NEGATIVE
IMM GRANULOCYTES # BLD AUTO: 0 K/MCL (ref 0–0.2)
IMM GRANULOCYTES # BLD: 0 %
INSULIN P FAST SERPL-ACNC: 12 MUNITS/L (ref 3–28)
IRON SATN MFR SERPL: 20 % (ref 15–45)
IRON SERPL-MCNC: 93 MCG/DL (ref 50–170)
KETONES UR STRIP-MCNC: NEGATIVE MG/DL
LDH SERPL L TO P-CCNC: 169 UNITS/L (ref 82–240)
LDLC SERPL CALC-MCNC: 182 MG/DL
LEUKOCYTE ESTERASE UR QL STRIP: NEGATIVE
LH SERPL-ACNC: 3.9 MUNITS/ML
LYMPHOCYTES # BLD: 1.9 K/MCL (ref 1–4.8)
LYMPHOCYTES NFR BLD: 38 %
MAGNESIUM SERPL-MCNC: 2.2 MG/DL (ref 1.7–2.4)
MCH RBC QN AUTO: 31.2 PG (ref 26–34)
MCHC RBC AUTO-ENTMCNC: 32.8 G/DL (ref 32–36.5)
MCV RBC AUTO: 95.2 FL (ref 78–100)
MONOCYTES # BLD: 0.3 K/MCL (ref 0.3–0.9)
MONOCYTES NFR BLD: 6 %
NEUTROPHILS # BLD: 2.5 K/MCL (ref 1.8–7.7)
NEUTROPHILS NFR BLD: 52 %
NITRITE UR QL STRIP: NEGATIVE
NONHDLC SERPL-MCNC: 220 MG/DL
NRBC BLD MANUAL-RTO: 0 /100 WBC
PH UR STRIP: 6 [PH] (ref 5–7)
PLATELET # BLD AUTO: 299 K/MCL (ref 140–450)
POTASSIUM SERPL-SCNC: 4.5 MMOL/L (ref 3.4–5.1)
PROGEST SERPL-MCNC: 0.64 NG/ML
PROT SERPL-MCNC: 7.1 G/DL (ref 6.4–8.2)
PROT UR STRIP-MCNC: NEGATIVE MG/DL
RBC # BLD: 4.84 MIL/MCL (ref 4–5.2)
SODIUM SERPL-SCNC: 137 MMOL/L (ref 135–145)
SP GR UR STRIP: 1.02 (ref 1–1.03)
T3 SERPL-MCNC: 1.22 NG/ML (ref 0.6–1.81)
T3FREE SERPL-MCNC: 2.8 PG/ML (ref 2.2–4)
T4 FREE SERPL-MCNC: 0.8 NG/DL (ref 0.8–1.5)
T4 SERPL-MCNC: 7.6 MCG/DL (ref 4.7–13.3)
THYROGLOB AB SERPL-ACNC: <0.9 IU/ML (ref 0–4)
THYROPEROXIDASE AB SERPL-ACNC: 64 UNITS/ML
TIBC SERPL-MCNC: 472 MCG/DL (ref 250–450)
TRIGL SERPL-MCNC: 189 MG/DL
TSH SERPL-ACNC: 2.83 MCUNITS/ML (ref 0.35–5)
URATE SERPL-MCNC: 4.8 MG/DL (ref 2.6–5.9)
UROBILINOGEN UR STRIP-MCNC: 0.2 MG/DL
VIT B12 SERPL-MCNC: 1110 PG/ML (ref 211–911)
WBC # BLD: 4.9 K/MCL (ref 4.2–11)

## 2024-04-08 LAB
DHEA-S SERPL-MCNC: 277 MCG/DL (ref 8–391)
ZINC SERPL-MCNC: 81 MCG/DL (ref 70–120)

## 2024-04-10 LAB
SHBG SERPL-SCNC: 33 NMOL/L (ref 25–122)
TESTOST FREE SERPL DL<=1.0 NG/DL-MCNC: 3.7 PG/ML (ref 1.3–9.2)
TESTOST SERPL-MCNC: 23 NG/DL (ref 9–55)
TESTOSTERONE.FREE+WB SERPL-MCNC: 10.8 NG/DL (ref 4.1–25.5)

## (undated) NOTE — LETTER
09/05/18        [place] [address] [address]      Dear Orly Martins,    Our records indicate that you have outstanding lab work and or testing that was ordered for you and has not yet been completed:          CBC W Differential W P